# Patient Record
Sex: MALE | Race: BLACK OR AFRICAN AMERICAN | NOT HISPANIC OR LATINO | Employment: OTHER | URBAN - METROPOLITAN AREA
[De-identification: names, ages, dates, MRNs, and addresses within clinical notes are randomized per-mention and may not be internally consistent; named-entity substitution may affect disease eponyms.]

---

## 2021-03-05 ENCOUNTER — IMMUNIZATION (OUTPATIENT)
Dept: INTERNAL MEDICINE | Facility: CLINIC | Age: 71
End: 2021-03-05

## 2021-03-05 DIAGNOSIS — Z23 NEED FOR VACCINATION: Primary | ICD-10-CM

## 2021-03-05 PROCEDURE — 0001A COVID-19, MRNA, LNP-S, PF, 30 MCG/0.3 ML DOSE VACCINE: CPT | Mod: CV19,,, | Performed by: FAMILY MEDICINE

## 2021-03-05 PROCEDURE — 91300 COVID-19, MRNA, LNP-S, PF, 30 MCG/0.3 ML DOSE VACCINE: ICD-10-PCS | Mod: ,,, | Performed by: FAMILY MEDICINE

## 2021-03-05 PROCEDURE — 0001A COVID-19, MRNA, LNP-S, PF, 30 MCG/0.3 ML DOSE VACCINE: ICD-10-PCS | Mod: CV19,,, | Performed by: FAMILY MEDICINE

## 2021-03-05 PROCEDURE — 91300 COVID-19, MRNA, LNP-S, PF, 30 MCG/0.3 ML DOSE VACCINE: CPT | Mod: ,,, | Performed by: FAMILY MEDICINE

## 2021-03-26 ENCOUNTER — IMMUNIZATION (OUTPATIENT)
Dept: INTERNAL MEDICINE | Facility: CLINIC | Age: 71
End: 2021-03-26

## 2021-03-26 DIAGNOSIS — Z23 NEED FOR VACCINATION: Primary | ICD-10-CM

## 2021-03-26 PROCEDURE — 91300 COVID-19, MRNA, LNP-S, PF, 30 MCG/0.3 ML DOSE VACCINE: ICD-10-PCS | Mod: ,,, | Performed by: FAMILY MEDICINE

## 2021-03-26 PROCEDURE — 0002A COVID-19, MRNA, LNP-S, PF, 30 MCG/0.3 ML DOSE VACCINE: CPT | Mod: CV19,,, | Performed by: FAMILY MEDICINE

## 2021-03-26 PROCEDURE — 0002A COVID-19, MRNA, LNP-S, PF, 30 MCG/0.3 ML DOSE VACCINE: ICD-10-PCS | Mod: CV19,,, | Performed by: FAMILY MEDICINE

## 2021-03-26 PROCEDURE — 91300 COVID-19, MRNA, LNP-S, PF, 30 MCG/0.3 ML DOSE VACCINE: CPT | Mod: ,,, | Performed by: FAMILY MEDICINE

## 2021-09-19 ENCOUNTER — IMMUNIZATION (OUTPATIENT)
Dept: PRIMARY CARE CLINIC | Facility: CLINIC | Age: 71
End: 2021-09-19
Payer: OTHER GOVERNMENT

## 2021-09-19 DIAGNOSIS — Z23 NEED FOR VACCINATION: Primary | ICD-10-CM

## 2021-09-19 PROCEDURE — 91300 COVID-19, MRNA, LNP-S, PF, 30 MCG/0.3 ML DOSE VACCINE: CPT | Mod: S$GLB,,, | Performed by: FAMILY MEDICINE

## 2021-09-19 PROCEDURE — 91300 COVID-19, MRNA, LNP-S, PF, 30 MCG/0.3 ML DOSE VACCINE: ICD-10-PCS | Mod: S$GLB,,, | Performed by: FAMILY MEDICINE

## 2021-09-19 PROCEDURE — 0003A COVID-19, MRNA, LNP-S, PF, 30 MCG/0.3 ML DOSE VACCINE: ICD-10-PCS | Mod: S$GLB,,, | Performed by: FAMILY MEDICINE

## 2021-09-19 PROCEDURE — 0003A COVID-19, MRNA, LNP-S, PF, 30 MCG/0.3 ML DOSE VACCINE: CPT | Mod: S$GLB,,, | Performed by: FAMILY MEDICINE

## 2023-05-18 ENCOUNTER — TELEPHONE (OUTPATIENT)
Dept: GASTROENTEROLOGY | Facility: CLINIC | Age: 73
End: 2023-05-18
Payer: OTHER GOVERNMENT

## 2023-05-31 ENCOUNTER — OFFICE VISIT (OUTPATIENT)
Dept: OPHTHALMOLOGY | Facility: CLINIC | Age: 73
End: 2023-05-31
Payer: OTHER GOVERNMENT

## 2023-05-31 DIAGNOSIS — H40.1134 PRIMARY OPEN ANGLE GLAUCOMA (POAG) OF BOTH EYES, INDETERMINATE STAGE: ICD-10-CM

## 2023-05-31 DIAGNOSIS — H25.12 NUCLEAR SCLEROSIS OF LEFT EYE: ICD-10-CM

## 2023-05-31 DIAGNOSIS — H25.11 NUCLEAR SCLEROSIS OF RIGHT EYE: ICD-10-CM

## 2023-05-31 DIAGNOSIS — H34.231 BRAO (BRANCH RETINAL ARTERY OCCLUSION), RIGHT: Primary | ICD-10-CM

## 2023-05-31 PROCEDURE — 99204 PR OFFICE/OUTPT VISIT, NEW, LEVL IV, 45-59 MIN: ICD-10-PCS | Mod: S$PBB,,, | Performed by: STUDENT IN AN ORGANIZED HEALTH CARE EDUCATION/TRAINING PROGRAM

## 2023-05-31 PROCEDURE — 92133 POSTERIOR SEGMENT OCT OPTIC NERVE(OCULAR COHERENCE TOMOGRAPHY) - OU - BOTH EYES: ICD-10-PCS | Mod: 26,S$PBB,, | Performed by: STUDENT IN AN ORGANIZED HEALTH CARE EDUCATION/TRAINING PROGRAM

## 2023-05-31 PROCEDURE — 99999 PR PBB SHADOW E&M-EST. PATIENT-LVL II: ICD-10-PCS | Mod: PBBFAC,,, | Performed by: STUDENT IN AN ORGANIZED HEALTH CARE EDUCATION/TRAINING PROGRAM

## 2023-05-31 PROCEDURE — 99999 PR PBB SHADOW E&M-EST. PATIENT-LVL II: CPT | Mod: PBBFAC,,, | Performed by: STUDENT IN AN ORGANIZED HEALTH CARE EDUCATION/TRAINING PROGRAM

## 2023-05-31 PROCEDURE — 99212 OFFICE O/P EST SF 10 MIN: CPT | Mod: PBBFAC | Performed by: STUDENT IN AN ORGANIZED HEALTH CARE EDUCATION/TRAINING PROGRAM

## 2023-05-31 PROCEDURE — 92133 CPTRZD OPH DX IMG PST SGM ON: CPT | Mod: PBBFAC | Performed by: STUDENT IN AN ORGANIZED HEALTH CARE EDUCATION/TRAINING PROGRAM

## 2023-05-31 PROCEDURE — 99204 OFFICE O/P NEW MOD 45 MIN: CPT | Mod: S$PBB,,, | Performed by: STUDENT IN AN ORGANIZED HEALTH CARE EDUCATION/TRAINING PROGRAM

## 2023-05-31 NOTE — PROGRESS NOTES
HPI     Glaucoma     Additional comments: Patient states va is stable. Patient states he is   using an eye drops but does not recall the name of it. Patient denies any   pain or irritation.          Last edited by Sandi Villegas on 5/31/2023  9:12 AM.            Assessment /Plan     For exam results, see Encounter Report.    BRAO (branch retinal artery occlusion), right- BRAO, sclerotic vessel noted in macular will forward to PCP for thrombotic workup including carotid doppler, echocardiogram, and consideration of CNS imagining   Referring patient to  for retinal eval     Nuclear sclerosis of right eye- - NVS, monitor  Nuclear sclerosis of left eye    Primary open angle glaucoma (POAG) of both eyes, indeterminate stage- IOP Controlled with no evidence of progression. Continue current treatment. Reviewed importance of continued compliance with treatment and follow up.   Continue:  Cosopt BID OU      Return to clinic next aval. For HVF 24-2, IOP CHECK  Return to clinic with  for retinal eval and IVFA in 1-2M

## 2023-06-07 ENCOUNTER — OFFICE VISIT (OUTPATIENT)
Dept: GASTROENTEROLOGY | Facility: CLINIC | Age: 73
End: 2023-06-07
Payer: OTHER GOVERNMENT

## 2023-06-07 VITALS
HEIGHT: 67 IN | DIASTOLIC BLOOD PRESSURE: 84 MMHG | HEART RATE: 69 BPM | BODY MASS INDEX: 28.68 KG/M2 | WEIGHT: 182.75 LBS | SYSTOLIC BLOOD PRESSURE: 156 MMHG

## 2023-06-07 DIAGNOSIS — Z86.010 HISTORY OF COLON POLYPS: Primary | ICD-10-CM

## 2023-06-07 PROCEDURE — 99999 PR PBB SHADOW E&M-EST. PATIENT-LVL V: CPT | Mod: PBBFAC,,, | Performed by: NURSE PRACTITIONER

## 2023-06-07 PROCEDURE — 99999 PR PBB SHADOW E&M-EST. PATIENT-LVL V: ICD-10-PCS | Mod: PBBFAC,,, | Performed by: NURSE PRACTITIONER

## 2023-06-07 PROCEDURE — 99499 NO LOS: ICD-10-PCS | Mod: S$PBB,,, | Performed by: NURSE PRACTITIONER

## 2023-06-07 PROCEDURE — 99215 OFFICE O/P EST HI 40 MIN: CPT | Mod: PBBFAC | Performed by: NURSE PRACTITIONER

## 2023-06-07 PROCEDURE — 99499 UNLISTED E&M SERVICE: CPT | Mod: S$PBB,,, | Performed by: NURSE PRACTITIONER

## 2023-06-07 RX ORDER — TRIAMCINOLONE ACETONIDE 1 MG/G
CREAM TOPICAL
COMMUNITY
Start: 2023-02-15

## 2023-06-07 RX ORDER — DICLOFENAC SODIUM 10 MG/G
GEL TOPICAL
COMMUNITY
Start: 2022-11-25

## 2023-06-07 RX ORDER — AMLODIPINE BESYLATE 10 MG/1
10 TABLET ORAL
COMMUNITY
Start: 2023-03-15

## 2023-06-07 RX ORDER — PRAMOXINE HYDROCHLORIDE 10 MG/ML
LOTION TOPICAL
COMMUNITY
Start: 2023-02-15

## 2023-06-07 RX ORDER — LOSARTAN POTASSIUM 100 MG/1
50 TABLET ORAL
COMMUNITY
Start: 2023-03-15

## 2023-06-07 RX ORDER — POLYETHYLENE GLYCOL 3350, SODIUM SULFATE ANHYDROUS, SODIUM BICARBONATE, SODIUM CHLORIDE, POTASSIUM CHLORIDE 236; 22.74; 6.74; 5.86; 2.97 G/4L; G/4L; G/4L; G/4L; G/4L
4 POWDER, FOR SOLUTION ORAL ONCE
Qty: 4000 ML | Refills: 0 | Status: SHIPPED | OUTPATIENT
Start: 2023-06-07 | End: 2023-06-07

## 2023-06-07 RX ORDER — MELOXICAM 7.5 MG/1
7.5 TABLET ORAL
COMMUNITY
Start: 2023-01-03

## 2023-06-07 RX ORDER — CHOLECALCIFEROL (VITAMIN D3) 50 MCG
50 TABLET ORAL
COMMUNITY
Start: 2023-03-14

## 2023-06-07 RX ORDER — MINERAL OIL/UREA/PEG/WATER
LOTION (ML) TOPICAL
COMMUNITY
Start: 2023-02-13

## 2023-06-07 RX ORDER — DORZOLAMIDE HYDROCHLORIDE AND TIMOLOL MALEATE 20; 5 MG/ML; MG/ML
SOLUTION/ DROPS OPHTHALMIC
COMMUNITY
Start: 2023-03-02

## 2023-06-07 RX ORDER — METFORMIN HYDROCHLORIDE 500 MG/1
1 TABLET, EXTENDED RELEASE ORAL DAILY
COMMUNITY
Start: 2022-11-25 | End: 2023-08-15

## 2023-06-07 RX ORDER — SOD SULF/POT CHLORIDE/MAG SULF 1.479 G
12 TABLET ORAL DAILY
Qty: 24 TABLET | Refills: 0 | Status: SHIPPED | OUTPATIENT
Start: 2023-06-07 | End: 2023-08-15

## 2023-06-07 RX ORDER — AMMONIUM LACTATE 12 G/100G
LOTION TOPICAL
COMMUNITY
Start: 2023-02-15

## 2023-06-07 RX ORDER — LIDOCAINE 50 MG/G
PATCH TOPICAL
COMMUNITY
Start: 2023-02-13

## 2023-06-07 NOTE — PROGRESS NOTES
Clinic Consult:  [facility] Consultation Note    Reason for Consult:  The encounter diagnosis was History of colon polyps.    PCP: Marco Schwartz   [address] [facility] [address]    HPI:  This is a 72 y.o. male here for evaluation of colon cancer screening.   Prior colonoscopy?: yes  Date of last colonoscopy: over 5 years   History of colon polyps: yes  Recall: ?  Family history of colon cancer: no  Abdominal pain, hematochezia, melena, nausea, vomiting, or weight loss: no    Review of Systems   Constitutional:  Negative for fever and weight loss.   HENT:  Negative for sore throat.    Respiratory:  Negative for cough, shortness of breath and wheezing.    Cardiovascular:  Negative for chest pain and palpitations.   Gastrointestinal:  Negative for abdominal pain, blood in stool, constipation, diarrhea, heartburn, melena, nausea and vomiting.   Genitourinary:  Negative for dysuria and frequency.   Skin:  Negative for itching and rash.   Neurological:  Negative for dizziness, speech change, seizures, loss of consciousness and headaches.     Medical History:  has a past medical history of HTN (hypertension) and Personal history of colonic polyps.    Surgical History:  has a past surgical history that includes Appendectomy and Knee surgery (Bilateral).    Family History: family history includes Diabetes in his mother..     Social History:  reports that he has quit smoking. He has never used smokeless tobacco.    Allergies: Reviewed    Home Medications:   Current Outpatient Medications on File Prior to Visit   Medication Sig Dispense Refill    amLODIPine (NORVASC) 10 MG tablet 10 mg.      ammonium lactate (LAC-HYDRIN) 12 % lotion APPLY LIBERAL AMOUNT TOPICALLY TWICE A DAY FOR DRY SKIN      cholecalciferol, vitamin D3, (VITAMIN D3) 50 mcg (2,000 unit) Tab 50 mcg.      diclofenac sodium (VOLTAREN) 1 % Gel APPLY 4 GRAMS TOPICALLY FOUR TIMES A DAY AS NEEDED FOR PAIN AND INFLAMMATION. USE ENCLOSED DOSING  "CARD.      dorzolamide-timolol 2-0.5% (COSOPT) 22.3-6.8 mg/mL ophthalmic solution INSTILL 1 DROP IN EACH EYE TWICE A DAY FOR GLAUCOMA      emollient combination no.117 (EUCERIN ADVANCED REPAIR HAND) Crea APPLY TO LEGS A SMALL AMOUNT TOPICALLY ONCE DAILY AS NEEDED FOR DRY SKIN **APPLY TO DAMP SKIN AFTER BATH/SHOWER ROUTINELY**      LIDOcaine (LIDODERM) 5 % APPLY 1 PATCH TOPICALLY EVERY DAY AS NEEDED FOR PAIN WEAR FOR 12 HOURS, THEN REMOVE. DO NOT APPLY NEW PATCH FOR AT LEAST 12 HOURS      losartan (COZAAR) 100 MG tablet 50 mg.      meloxicam (MOBIC) 7.5 MG tablet 7.5 mg.      metFORMIN (GLUCOPHAGE-XR) 500 MG ER 24hr tablet Take 1 tablet by mouth once daily.      pramoxine 1 % Lotn APPLY LIBERAL AMOUNT TOPICALLY  AS NEEDED KEEP IN FRIDGE, USE AS NEEDED FOR ITCHING      triamcinolone acetonide 0.1% (KENALOG) 0.1 % cream APPLY SMALL AMOUNT TOPICALLY TWICE A DAY FOR ITCHING APPLY CREAM TO RED ITCHY RASH ON LEG FOR 2-4 WEEKS, STOP WHEN CLEAR.       No current facility-administered medications on file prior to visit.       Physical Exam:  BP (!) 156/84 (BP Location: Left arm, Patient Position: Sitting, BP Method: Medium (Manual))   Pulse 69   Ht 5' 7" (1.702 m)   Wt 82.9 kg (182 lb 12.2 oz)   BMI 28.62 kg/m²   Body mass index is 28.62 kg/m².  Physical Exam  Constitutional:       General: He is not in acute distress.  HENT:      Head: Normocephalic and atraumatic.   Eyes:      General: No scleral icterus.     Conjunctiva/sclera: Conjunctivae normal.   Cardiovascular:      Rate and Rhythm: Normal rate and regular rhythm.      Heart sounds: Normal heart sounds.   Pulmonary:      Effort: Pulmonary effort is normal. No respiratory distress.      Breath sounds: Normal breath sounds.   Skin:     General: Skin is warm and dry.      Findings: No rash.   Neurological:      General: No focal deficit present.      Mental Status: He is alert and oriented to person, place, and time.   Psychiatric:         Mood and Affect: Mood " normal.         Behavior: Behavior normal.       Labs: Pertinent labs reviewed.  CRC Screening: due     Assessment:  1. History of colon polyps      Recommendations:   - colonoscopy  - A referral has been placed for endoscopy procedure scheduling and pre-admission testing (PAT) appointment has been scheduled.      History of colon polyps  -     Case Request Endoscopy: COLONOSCOPY  -     Ambulatory referral/consult to Endo Procedure ; Future; Expected date: 06/08/2023  -     polyethylene glycol (GOLYTELY) 236-22.74-6.74 -5.86 gram suspension; Take 4,000 mLs (4 L total) by mouth once. for 1 dose  Dispense: 4000 mL; Refill: 0  -     sod sulf-pot chloride-mag sulf (SUTAB) 1.479-0.188- 0.225 gram tablet; Take 12 tablets by mouth once daily. Take according to package instructions with indicated amount of water.  Dispense: 24 tablet; Refill: 0      Follow up if symptoms worsen or fail to improve.    Thank you so much for allowing me to participate in the care of SOY Magana

## 2023-06-08 ENCOUNTER — HOSPITAL ENCOUNTER (OUTPATIENT)
Dept: PREADMISSION TESTING | Facility: HOSPITAL | Age: 73
Discharge: HOME | End: 2023-06-08
Attending: INTERNAL MEDICINE
Payer: OTHER GOVERNMENT

## 2023-06-08 DIAGNOSIS — Z86.010 HISTORY OF COLON POLYPS: ICD-10-CM

## 2023-06-26 ENCOUNTER — HOSPITAL ENCOUNTER (OUTPATIENT)
Dept: PREADMISSION TESTING | Facility: HOSPITAL | Age: 73
Discharge: HOME | End: 2023-06-26
Attending: INTERNAL MEDICINE
Payer: OTHER GOVERNMENT

## 2023-07-03 ENCOUNTER — ANESTHESIA EVENT (OUTPATIENT)
Dept: ENDOSCOPY | Facility: HOSPITAL | Age: 73
End: 2023-07-03
Payer: OTHER GOVERNMENT

## 2023-07-03 NOTE — ANESTHESIA PREPROCEDURE EVALUATION
07/03/2023  Cb Hooper is a 72 y.o., male.  Past Medical History:   Diagnosis Date    HTN (hypertension)     Personal history of colonic polyps      Past Surgical History:   Procedure Laterality Date    APPENDECTOMY      KNEE SURGERY Bilateral          Pre-op Assessment    I have reviewed the Patient Summary Reports.     I have reviewed the Nursing Notes. I have reviewed the NPO Status.   I have reviewed the Medications.     Review of Systems  Anesthesia Hx:  No problems with previous Anesthesia    Social:  Non-Smoker, Social Alcohol Use    Hematology/Oncology:  Hematology Normal   Oncology Normal     EENT/Dental:EENT/Dental Normal   Cardiovascular:   Hypertension    Pulmonary:  Pulmonary Normal    Renal/:  Renal/ Normal     Hepatic/GI:  Hepatic/GI Normal    Musculoskeletal:  Musculoskeletal Normal    Neurological:  Neurology Normal    Endocrine:  Endocrine Normal    Dermatological:  Skin Normal    Psych:  Psychiatric Normal           Physical Exam  General: Well nourished, Cooperative, Alert and Oriented    Airway:  Mallampati: II   Mouth Opening: Normal  TM Distance: Normal  Tongue: Normal  Neck ROM: Normal ROM    Dental:  Partial Dentures, Intact  Partials out      Anesthesia Plan  Type of Anesthesia, risks & benefits discussed:    Anesthesia Type: Gen Natural Airway  Intra-op Monitoring Plan: Standard ASA Monitors  Induction:  IV  Informed Consent: Informed consent signed with the Patient and all parties understand the risks and agree with anesthesia plan.  All questions answered. Patient consented to blood products? No  ASA Score: 2  Day of Surgery Review of History & Physical: H&P Update referred to the surgeon/provider.    Ready For Surgery From Anesthesia Perspective.     .

## 2023-07-05 RX ORDER — METFORMIN HYDROCHLORIDE 500 MG/1
500 TABLET, EXTENDED RELEASE ORAL
COMMUNITY
Start: 2023-06-12

## 2023-07-07 ENCOUNTER — ANESTHESIA (OUTPATIENT)
Dept: ENDOSCOPY | Facility: HOSPITAL | Age: 73
End: 2023-07-07
Payer: OTHER GOVERNMENT

## 2023-07-07 ENCOUNTER — HOSPITAL ENCOUNTER (OUTPATIENT)
Facility: HOSPITAL | Age: 73
Discharge: HOME | End: 2023-07-07
Attending: INTERNAL MEDICINE | Admitting: INTERNAL MEDICINE
Payer: OTHER GOVERNMENT

## 2023-07-07 VITALS
SYSTOLIC BLOOD PRESSURE: 172 MMHG | HEIGHT: 66 IN | RESPIRATION RATE: 20 BRPM | WEIGHT: 181.56 LBS | TEMPERATURE: 98 F | OXYGEN SATURATION: 99 % | BODY MASS INDEX: 29.18 KG/M2 | DIASTOLIC BLOOD PRESSURE: 84 MMHG | HEART RATE: 51 BPM

## 2023-07-07 DIAGNOSIS — Z12.11 COLON CANCER SCREENING: Primary | ICD-10-CM

## 2023-07-07 LAB — POCT GLUCOSE: 107 MG/DL (ref 70–110)

## 2023-07-07 PROCEDURE — 63600175 PHARM REV CODE 636 W HCPCS: Performed by: INTERNAL MEDICINE

## 2023-07-07 PROCEDURE — D9220A PRA ANESTHESIA: ICD-10-PCS | Mod: 33,CRNA,, | Performed by: NURSE ANESTHETIST, CERTIFIED REGISTERED

## 2023-07-07 PROCEDURE — D9220A PRA ANESTHESIA: Mod: 33,CRNA,, | Performed by: NURSE ANESTHETIST, CERTIFIED REGISTERED

## 2023-07-07 PROCEDURE — 37000009 HC ANESTHESIA EA ADD 15 MINS: Performed by: INTERNAL MEDICINE

## 2023-07-07 PROCEDURE — 45385 COLONOSCOPY W/LESION REMOVAL: CPT | Mod: 33,,, | Performed by: INTERNAL MEDICINE

## 2023-07-07 PROCEDURE — 45385 PR COLONOSCOPY,REMV LESN,SNARE: ICD-10-PCS | Mod: 33,,, | Performed by: INTERNAL MEDICINE

## 2023-07-07 PROCEDURE — 88305 TISSUE EXAM BY PATHOLOGIST: ICD-10-PCS | Mod: 26,,, | Performed by: STUDENT IN AN ORGANIZED HEALTH CARE EDUCATION/TRAINING PROGRAM

## 2023-07-07 PROCEDURE — 88305 TISSUE EXAM BY PATHOLOGIST: CPT | Mod: 59 | Performed by: STUDENT IN AN ORGANIZED HEALTH CARE EDUCATION/TRAINING PROGRAM

## 2023-07-07 PROCEDURE — 00811 ANES LWR INTST NDSC NOS: CPT | Performed by: INTERNAL MEDICINE

## 2023-07-07 PROCEDURE — D9220A PRA ANESTHESIA: ICD-10-PCS | Mod: 33,ANES,, | Performed by: ANESTHESIOLOGY

## 2023-07-07 PROCEDURE — D9220A PRA ANESTHESIA: Mod: 33,ANES,, | Performed by: ANESTHESIOLOGY

## 2023-07-07 PROCEDURE — 88305 TISSUE EXAM BY PATHOLOGIST: CPT | Mod: 26,,, | Performed by: STUDENT IN AN ORGANIZED HEALTH CARE EDUCATION/TRAINING PROGRAM

## 2023-07-07 PROCEDURE — 37000008 HC ANESTHESIA 1ST 15 MINUTES: Performed by: INTERNAL MEDICINE

## 2023-07-07 PROCEDURE — 45385 COLONOSCOPY W/LESION REMOVAL: CPT | Mod: PT | Performed by: INTERNAL MEDICINE

## 2023-07-07 PROCEDURE — 63600175 PHARM REV CODE 636 W HCPCS: Performed by: NURSE ANESTHETIST, CERTIFIED REGISTERED

## 2023-07-07 PROCEDURE — 27201089 HC SNARE, DISP (ANY): Performed by: INTERNAL MEDICINE

## 2023-07-07 PROCEDURE — 25000003 PHARM REV CODE 250: Performed by: NURSE ANESTHETIST, CERTIFIED REGISTERED

## 2023-07-07 RX ORDER — SODIUM CHLORIDE, SODIUM LACTATE, POTASSIUM CHLORIDE, CALCIUM CHLORIDE 600; 310; 30; 20 MG/100ML; MG/100ML; MG/100ML; MG/100ML
INJECTION, SOLUTION INTRAVENOUS CONTINUOUS
Status: DISCONTINUED | OUTPATIENT
Start: 2023-07-07 | End: 2023-07-07 | Stop reason: HOSPADM

## 2023-07-07 RX ORDER — PROPOFOL 10 MG/ML
VIAL (ML) INTRAVENOUS
Status: DISCONTINUED | OUTPATIENT
Start: 2023-07-07 | End: 2023-07-07

## 2023-07-07 RX ORDER — LIDOCAINE HYDROCHLORIDE 20 MG/ML
INJECTION INTRAVENOUS
Status: DISCONTINUED | OUTPATIENT
Start: 2023-07-07 | End: 2023-07-07

## 2023-07-07 RX ADMIN — LIDOCAINE HYDROCHLORIDE 20 MG: 20 INJECTION INTRAVENOUS at 01:07

## 2023-07-07 RX ADMIN — PROPOFOL 50 MG: 10 INJECTION, EMULSION INTRAVENOUS at 01:07

## 2023-07-07 RX ADMIN — SODIUM CHLORIDE, POTASSIUM CHLORIDE, SODIUM LACTATE AND CALCIUM CHLORIDE: 600; 310; 30; 20 INJECTION, SOLUTION INTRAVENOUS at 12:07

## 2023-07-07 RX ADMIN — GLYCOPYRROLATE 0.2 MG: 0.2 INJECTION, SOLUTION INTRAMUSCULAR; INTRAVITREAL at 01:07

## 2023-07-07 NOTE — PR[employer]ATION PATIENT INSTRUCTIONS
Discharge Summary/Instructions after an Endoscopic Procedure  Patient Name: Cb Hooper  Patient MRN: [id number]  Patient YOB: 1950 Friday, July 7, 2023  Kyle Henry MD  Dear patient,  As a result of recent federal legislation (The Federal Cures Act), you may   receive lab or pathology results from your procedure in your [facility]   account before your physician is able to contact you. Your physician or   their representative will relay the results to you with their   recommendations at their soonest availability.  Thank you,  RESTRICTIONS:  During your procedure today, you received medications for sedation.  These   medications may affect your judgment, balance and coordination.  Therefore,   for 24 hours, you have the following restrictions:   - DO NOT drive a car, operate machinery, make legal/financial decisions,   sign important papers or drink alcohol.    ACTIVITY:  Today: no heavy lifting, straining or running due to procedural   sedation/anesthesia.  The following day: return to full activity including work.  DIET:  Eat and drink normally unless instructed otherwise.     TREATMENT FOR COMMON SIDE EFFECTS:  - Mild abdominal pain, nausea, belching, bloating or excessive gas:  rest,   eat lightly and use a heating pad.  - Sore Throat: treat with throat lozenges and/or gargle with warm salt   water.  - Because air was used during the procedure, expelling large amounts of air   from your rectum or belching is normal.  - If a bowel prep was taken, you may not have a bowel movement for 1-3 days.    This is normal.  SYMPTOMS TO WATCH FOR AND REPORT TO YOUR PHYSICIAN:  1. Abdominal pain or bloating, other than gas cramps.  2. Chest pain.  3. Back pain.  4. Signs of infection such as: chills or fever occurring within 24 hours   after the procedure.  5. Rectal bleeding, which would show as bright red, maroon, or black stools.   (A tablespoon of blood from the rectum is not serious, especially if    hemorrhoids are present.)  6. Vomiting.  7. Weakness or dizziness.  GO DIRECTLY TO THE NEAREST EMERGENCY ROOM IF YOU HAVE ANY OF THE FOLLOWING:      Difficulty breathing              Chills and/or fever over 101 F   Persistent vomiting and/or vomiting blood   Severe abdominal pain   Severe chest pain   Black, tarry stools   Bleeding- more than one tablespoon   Any other symptom or condition that you feel may need urgent attention  Your doctor recommends these additional instructions:  If any biopsies were taken, your doctors clinic will contact you in 1 to 2   weeks with any results.  - Discharge patient to home (via wheelchair).   - Resume previous diet.   - Continue present medications.   - Await pathology results.   - Repeat colonoscopy in 3 years for surveillance based on clinical status at   that time.   - Return to referring physician as previously scheduled.  For questions, problems or results please call your physician Kyle Henry MD at Work:  [phone]  If you have any questions about the above instructions, call the GI   department at [phone] or call the endoscopy unit at [phone]   from 7am until 3 pm.  [facility] [place], EMERGENCY ROOM PHONE NUMBER:   [phone]  IF A COMPLICATION OR EMERGENCY SITUATION ARISES AND YOU ARE UNABLE TO REACH   YOUR PHYSICIAN - GO DIRECTLY TO THE EMERGENCY ROOM.  I have read or have had read to me these discharge instructions for my   procedure and have received a written copy.  I understand these   instructions and will follow-up with my physician if I have any questions.     __________________________________       _____________________________________  Nurse Signature                                          Patient/Designated   Responsible Party Signature  MD Kyle Toscano MD  7/7/2023 1:36:33 PM  This report has been verified and signed electronically.  Dear patient,  As a result of recent  federal legislation (The Federal Cures Act), you may   receive lab or pathology results from your procedure in your [facility]   account before your physician is able to contact you. Your physician or   their representative will relay the results to you with their   recommendations at their soonest availability.  Thank you,  PROVATION

## 2023-07-07 NOTE — ANESTHESIA POSTPROCEDURE EVALUATION
Anesthesia Post Evaluation    Patient: Cb Hooper    Procedure(s) Performed: Procedure(s) (LRB):  COLONOSCOPY (N/A)    Final Anesthesia Type: general      Patient location during evaluation: PACU  Patient participation: Yes- Able to Participate  Level of consciousness: awake and alert and oriented  Post-procedure vital signs: reviewed and stable  Pain management: adequate  Airway patency: patent    PONV status at discharge: No PONV  Anesthetic complications: no      Cardiovascular status: blood pressure returned to baseline, stable and hemodynamically stable  Respiratory status: unassisted  Hydration status: euvolemic  Follow-up not needed.          Vitals Value Taken Time   /84 07/07/23 1413   Temp 36.6 °C (97.8 °F) 07/07/23 1413   Pulse 51 07/07/23 1413   Resp 20 07/07/23 1413   SpO2 99 % 07/07/23 1413         No case tracking events are documented in the log.      Pain/Andre Score: Andre Score: 10 (7/7/2023  2:00 PM)

## 2023-07-07 NOTE — PROGRESS NOTES
Pt HR 50-58, sinus bradycardia, awake /alert /oriented x 4. Consulted with Dr. Pierce pt ok to be discharge with lower heart rate to take PO valsartan once home and resume Amlodipine tomorrow per anesthesia orders. Pt and wife verbalized understanding with plan of care, no questions.

## 2023-07-07 NOTE — H&P
Endoscopy History and Physical    PCP - Marco Schwartz MD  Referring Physician - Maci Clement, [place]  [address] [place] [address]      ASA - per anesthesia  Mallampati - per anesthesia  History of Anesthesia problems - no  Family history Anesthesia problems -  no   Plan of anesthesia - General    HPI  72 y.o. male    Planned Procedure: Colonoscopy  Diagnosis: previous adenomatous polyp  Chief Complaint: Same as above    Personnel H/o colon polyps:yes  FH of colon cancer:no  Anticoagulation:no      ROS:  Constitutional: No fevers, chills, No weight loss  CV: No chest pain  Pulm: No cough, No shortness of breath  GI: see HPI    Medical History:  has a past medical history of HTN (hypertension) and Personal history of colonic polyps.    Surgical History:  has a past surgical history that includes Appendectomy and Knee surgery (Bilateral).    Family History: family history includes Diabetes in his mother..    Social History:  reports that he has quit smoking. He has never used smokeless tobacco.    Review of patient's allergies indicates:  No Known Allergies    Medications:   Medications Prior to Admission   Medication Sig Dispense Refill Last Dose    amLODIPine (NORVASC) 10 MG tablet 10 mg.   7/6/2023    ammonium lactate (LAC-HYDRIN) 12 % lotion APPLY LIBERAL AMOUNT TOPICALLY TWICE A DAY FOR DRY SKIN   Past Week    cholecalciferol, vitamin D3, (VITAMIN D3) 50 mcg (2,000 unit) Tab 50 mcg.   7/6/2023    diclofenac sodium (VOLTAREN) 1 % Gel APPLY 4 GRAMS TOPICALLY FOUR TIMES A DAY AS NEEDED FOR PAIN AND INFLAMMATION. USE ENCLOSED DOSING CARD.   Past Week    dorzolamide-timolol 2-0.5% (COSOPT) 22.3-6.8 mg/mL ophthalmic solution INSTILL 1 DROP IN EACH EYE TWICE A DAY FOR GLAUCOMA   Past Week    emollient combination no.117 (EUCERIN ADVANCED REPAIR HAND) Crea APPLY TO LEGS A SMALL AMOUNT TOPICALLY ONCE DAILY AS NEEDED FOR DRY SKIN **APPLY TO DAMP SKIN AFTER BATH/SHOWER ROUTINELY**   Past Week     LIDOcaine (LIDODERM) 5 % APPLY 1 PATCH TOPICALLY EVERY DAY AS NEEDED FOR PAIN WEAR FOR 12 HOURS, THEN REMOVE. DO NOT APPLY NEW PATCH FOR AT LEAST 12 HOURS   Past Week    losartan (COZAAR) 100 MG tablet 50 mg.   7/6/2023    meloxicam (MOBIC) 7.5 MG tablet 7.5 mg.   Past Week    metFORMIN (GLUCOPHAGE-XR) 500 MG ER 24hr tablet Take 1 tablet by mouth once daily.   7/6/2023    metFORMIN (GLUCOPHAGE-XR) 500 MG ER 24hr tablet 500 mg.   7/6/2023    pramoxine 1 % Lotn APPLY LIBERAL AMOUNT TOPICALLY  AS NEEDED KEEP IN FRIDGE, USE AS NEEDED FOR ITCHING   Past Week    sod sulf-pot chloride-mag sulf (SUTAB) 1.479-0.188- 0.225 gram tablet Take 12 tablets by mouth once daily. Take according to package instructions with indicated amount of water. 24 tablet 0 7/7/2023    triamcinolone acetonide 0.1% (KENALOG) 0.1 % cream APPLY SMALL AMOUNT TOPICALLY TWICE A DAY FOR ITCHING APPLY CREAM TO RED ITCHY RASH ON LEG FOR 2-4 WEEKS, STOP WHEN CLEAR.   Past Week       Physical Exam:    Vital Signs:   Vitals:    07/07/23 1251   BP: (!) 183/84   Pulse: (!) 58   Resp: 18   Temp: 97.9 °F (36.6 °C)       General Appearance: Well appearing in no acute distress  Abdomen: Soft, non tender, non distended with normal bowel sounds, no masses    Labs:  Lab Results   Component Value Date    WBC 5.88 06/24/2011    HGB 11.7 (L) 06/24/2011    HCT 36.3 (L) 06/24/2011     06/24/2011    CHOL 204 (H) 10/08/2014    TRIG 69 10/08/2014    HDL 75 10/08/2014    ALT 37 10/08/2014    AST 37 10/08/2014     10/08/2014    K 5.0 10/08/2014     10/08/2014    CREATININE 1.3 04/21/2015    BUN 16 10/08/2014    CO2 23 10/08/2014    TSH 1.330 10/08/2014    PSA 0.42 10/08/2014       I have explained the risks and benefits of this endoscopic procedure to the patient including but not limited to bleeding, inflammation, infection, perforation, and death.    SEDATION PLAN: per anesthesia       History reviewed, vital signs satisfactory, cardiopulmonary status  satisfactory, sedation options, risks and plans have been discussed with the patient  All their questions were answered and the patient agrees to the sedation procedures as planned and the patient is deemed an appropriate candidate for the sedation as planned.     The risks, benefits and alternatives of the procedure were discussed with the patient in detail. This discussion was had in the presence of endoscopy staff. The risks include, risks of adverse reaction to sedation requiring the use of reversal agents, bleeding requiring blood transfusion, perforation requiring surgical intervention and technical failure. Other risks include aspiration leading to respiratory distress and respiratory failure resulting in endotracheal intubation and mechanical ventilation including death. If anesthesia is being utilized for this procedure, it is up to the anesthesiologist to determine airway safety including elective endotracheal intubation. Questions were answered, they agree to proceed. There was no language barriers.       Procedure explained to patient, informed consent obtained and placed in chart.       Kyle Henry MD

## 2023-07-07 NOTE — PLAN OF CARE
Discharge instructions reviewed with pt and spouse, handouts given, verbalized understanding with no further questions at this time. Dr. Solano spoke to pt at bedside, reviewed procedure and answered questions aware they are awaiting biopsy results with MD telephone number provided per AVS sheet. VSS on RA, no pain or nausea noted, tolerating po fluids without difficulty, no other complaints noted. Fall precautions reviewed, consents in chart, PIV removed.

## 2023-07-07 NOTE — LETTER
Cb RANGEL Hooper  [address] [facility]  [place] [address]      [facility] ENDOSCOPY PATIENT INSTRUCTIONS  You are scheduled for a/an Colonoscopy (Procedure), on Friday (Day), 7/7/23 (Date).       You will be contacted prior to the day of the procedure with your arrival time.     Your procedure will be performed at [facility]. [facility] [address], [place] [address]. Many GPS systems are NOT providing accurate instructions, take [address], from either direction, to [address] and proceed to the eastbound service road, turning between the Mall and [facility]. Once at the [facility], look for signs directing you to Hospital/Surgery. Check in for your procedure at  for Hospital/Surgery. ([phone])    ALL PATIENTS:   ? Please plan to be at the hospital for 3 - 4 hours.   ? Use of Anesthesia requires you to have a responsible person to drive you to the hospital, stay while the procedure is being performed, assume responsibility for your care at discharge, and drive you home. You should not operate a vehicle, machinery or sign any legal documents until the next day. YOU MUST HAVE A RESPONSIBLE PERSON TO DRIVE YOU HOME.  ? Leave all valuables at home, including jewelry. (Piercings must be removed) You will need to bring your 's license, medical insurance card, and a method of payment. You will be responsible for any co-payment at time of registration - Please reach out to Financial Services if you have any questions or concerns.   ? If biopsies need to be performed or a polyp needs to be removed, this may result in a change in the billing of your procedure and could impact your payment responsibility depending on your insurance provider.   ? Wear clothing appropriate for easy re-dressing after sedation.   ? Please bring a complete list of all medications you are taking.     MEDICATIONS:  ? BLOOD THINNING MEDICATION (Coumadin, Plavix, Eliquis, etc.):  o If  you are on a blood thinning medication, our scheduling team will obtain clearance to hold from your prescribing physician. Please do not stop these medications until it is approved by your provider. Our scheduling nurse will notify you of an appropriate date and time to hold prior to your procedure.  o Coumadin (WARFARIN), Plavix (CLOPIDOGREL), and Effient (PRASUGREL) MUST be stopped 5 days prior to exam unless discussed with the doctor performing the test.   o Eliquis (APIXABAN), Savaysa (EDOXABAN), Arixtra (FONDAPARINUX), and Xarelto (RIVAROXABAN) MUST be stopped 2 days prior to exam unless discussed with the doctor performing the test.  ? WEIGHT LOSS MEDICATIONS - MUST be stopped 1week prior to your appointment  ? BLOOD PRESSURE, HEART, SEIZURE, LUNG, or PSYCHIATRIC MEDICATIONS you normally take in the morning, please take them the morning of your procedure. This includes Inhalers.   o Please take these medications one hour prior to your arrival time with a small sip of water    DIABETIC PATIENTS:   ? Do not take any diabetic medications (including insulin) the morning of the exam.   ? If your blood sugar goes below 70, you may drink 2 ounces of clear juice, soda. Wait 15 minutes, then recheck your blood sugar. If it isn't going up, you may drink another 2 ounces of clear juice and contact the On-Call Nurse line at [phone].       OMCBR GOLYTELY, COLYTE, NULYTELY, GENERIC PEG 3350 W/ELECTROLYTES INSTRUCTIONS  Please use this page as a checklist for your preparation.      [] ITEMS TO PURCHASE BEFORE YOUR PROCEDURE:  Please purchase the following items from your local pharmacy prior to your appointment.    4 Dulcolax (bisacodyl) 5 mg laxative tablets- no prescription needed, over the counter.   Gas X (simethicone) 125 mg capsules - no prescription needed, over the counter.    Dramamine (Meclizine) 25 mg tablet (for nausea)- no prescription needed, over the counter.   Prep- A prescription is required and  "has been sent to your pharmacy    [] FIVE DAYS BEFORE YOUR PROCEDURE: Begin low fiber diet- see attached instructions.    []THE DAY BEFORE YOUR PROCEDURE :(WHEN YOU WAKE)  Begin clear liquids only - no solid foods may be eaten until after your procedure has been performed/completed.  Please drink 1 - 2 gallons of clear liquids throughout the day.   You may consume the following items:    Coffee, water, or tea. (We agree it's odd, but coffee and tea without milk or creamer is considered a clear liquid)    Clear carbonated beverages (soft drinks), ginger ale, sprite, 7up, sparkling water, etc. No "Energy" beverages.    Gelatin dessert, (JELLO) plain or fruit flavored. No red or purple coloring/No solid pieces of fruit.   Apple juice, white grape juice, or white cranberry juice. No pulp, no orange juice.    Gatorade, Powerade, lemonade, or limeade. No red or purple.    Clear, fat-free, beef or chicken broths, or bouillon.    Snowballs, popsicles, slushes. No red or purple coloring, no pulp.    Avoid any liquids not listed above.     [] 12:00 PM (the day before procedure):   Take 4 Dulcolax (bisacodyl) tablets with a glass of clear liquid   Take 1 Gas X (simethicone) 125 mg capsule with a glass of clear liquid    [] 5:00 PM (the day before procedure), for prevention of nausea and vomiting:   Take ½ of a tablet (12.5 mg) of Meclizine every 6 hours as needed for nausea and/or vomiting. DO NOT TAKE MORE THAN 50 MG IN A 24 HOUR PERIOD.    [] 6:00 PM (the day before procedure) Begin the first portion of the Golytely or Nulytely prep. (Best if refrigerated)  1. Add cool clear liquid to mixing container up to the fill line and mix.  2. Drink HALF of the mixture in the container.  3. You may refrigerate remaining half of the prep until ready to use the next morning.  Please have this consumed within 1 hour and 30 minutes. ---VERY IMPORTANT---  This entire process is required for the success of the " examination.    Clear liquids may be continued until you finish the second portion of the prep. This will help you remain hydrated.    [] ____ AM (THE MORNING OF YOUR PROCEDURE): Complete the second portion of your prep. You will be notified the day before of this time.  1. Drink the second HALF of the mixture that was prepared the previous evening.  Please have this consumed within 1 hour and 30 minutes. ---VERY IMPORTANT---  This entire process is required for the success of the examination.      After you complete the bowel prep and the required water/clear liquid, you may not have anything else by mouth except for your medications, with a small sip of water. This includes no gum, mints, tobacco products.    Please follow these instructions to ensure you have a very good prep.  The goal is for stool to be CLEAR OR YELLOW liquid - NOT BROWN.  Avoid having to repeat the procedure due to a poor prep!    Please call [phone] or [phone] if you continue to have brown stool or have any questions about your prep instructions. Remember, due to Anesthesia, you MUST have a responsible person to drive you home!        LOW FIBER DIET  FIVE (5) DAYS BEFORE YOUR PROCEDURE- Please start a Low Fiber Diet as below:  A good preparation (clean out) of the colon is necessary prior to having a colonoscopy in order to ensure that all areas of the colon can be seen without difficulty. It is helpful to avoid high fiber foods prior to having a colonoscopy as high fiber foods (especially seeds and nuts) are more difficult to completely clear out of the colon. We recommend avoiding high fiber foods for at least 4 days prior to your colonoscopy. If you have issues with constipation you may want to start avoiding high fiber foods 7 days before the procedure.    Foods to Include In Your Diet:       Grain Products:    Enriched refined white bread, buns, bagels, English muffins   Plain cereals e.g., Cheerios, Cornflakes, Cream of  Wheat, Rice Krispies, Special K   Tea biscuits, arrowroot cookies, soda crackers, diana crackers, plain [place] toast and flour tortillas  White rice, refined pasta and noodles   Fruits:   peel fruits when possible  Fruit juices except prune juice   Soft fruits: apricots, banana (1/2), cantaloupe, canned fruit cocktail, grapes,   honeydew melon, peaches, watermelon, citrus fruits, plums, pineapple   Sauces: Apple or apricot        Vegetables:    Vegetable juices    Tomato sauces    Potatoes (no skin)    Well-cooked and tender vegetables including alfalfa sprouts, spinach, beets, carrots, celery, cucumber, eggplant, lettuce, mushrooms, green/red peppers, squash, zucchini, onions, brussel sprouts, asparagus     Meat and Protein Choice:  Well-cooked, tender meat, fish and eggs            Foods to avoid:   ? Whole grain breads and pastas, corn bread or muffins, products made with whole grain products, bran, seeds, or nuts   ? Strong cheeses, yogurt containing fruit skins or seeds   ? Raw vegetables and pickles  ? All types of whole kernel corn   ? All beans, peas, and legumes   ? Fruit peels and hard fruits   ? Tough meat, meat with gristle   ? Crunchy peanut butter   ? Nuts, seeds and popcorn   ? Millet, buckwheat, flax, oatmeal, braulio seeds   ? Dried fruits, berries, other fruits with pulp or seeds (including blueberries, raspberries, and blackberries)   ? Food containing chocolate, coconut   ? Juices with pulp     Avoiding the recommended foods is one part of helping to ensure that you have adequate preparation and that you do not need to have a repeat colonoscopy any sooner than what would be recommended by the colon cancer screening guidelines.    Please follow these instructions to ensure you have a very good prep.  The goal is for stool to be CLEAR OR YELLOW liquid - NOT BROWN.  Avoid having to repeat the procedure due to a poor prep!  Please call [phone] or [phone] if you continue to have brown stool  or have any questions about your prep instructions.     Questions regarding scheduling: Endoscopy Scheduling [phone] (M-F, 7:30am-4:30pm)   Questions about Insurance/ Financial obligations: Financial Services [phone]   Questions requiring immediate assistance: [facility] On-Call Nurse Line [phone] (After Hours)

## 2023-07-07 NOTE — DISCHARGE SUMMARY
[facility] [place] [facility] 1st Fl  Discharge Note  Short Stay    Procedure(s) (LRB):  COLONOSCOPY (N/A)      OUTCOME: Patient tolerated treatment/procedure well without complication and is now ready for discharge.    DISPOSITION: Home or Self Care    FINAL DIAGNOSIS:  Colon cancer screening    FOLLOWUP: With primary care provider    DISCHARGE INSTRUCTIONS:  No discharge procedures on file.     TIME SPENT ON DISCHARGE: 20 minutes

## 2023-07-07 NOTE — TRANSFER OF CARE
"Anesthesia Transfer of Care Note    Patient: Cb Hooper    Procedure(s) Performed: Procedure(s) (LRB):  COLONOSCOPY (N/A)    Patient location: PACU    Anesthesia Type: general    Transport from OR: Transported from OR on room air with adequate spontaneous ventilation    Post pain: adequate analgesia    Post assessment: no apparent anesthetic complications and tolerated procedure well    Post vital signs: stable    Level of consciousness: awake, alert and oriented    Nausea/Vomiting: no nausea/vomiting    Complications: none    Transfer of care protocol was followed      Last vitals:   Visit Vitals  /77 (BP Location: Right arm, Patient Position: Lying)   Pulse (!) 58   Temp 36.4 °C (97.6 °F) (Temporal)   Resp 17   Ht 5' 6" (1.676 m)   Wt 82.3 kg (181 lb 8.8 oz)   SpO2 98%   BMI 29.30 kg/m²     "

## 2023-07-13 LAB
FINAL PATHOLOGIC DIAGNOSIS: NORMAL
Lab: NORMAL

## 2023-07-14 ENCOUNTER — OFFICE VISIT (OUTPATIENT)
Dept: OPHTHALMOLOGY | Facility: CLINIC | Age: 73
End: 2023-07-14
Payer: OTHER GOVERNMENT

## 2023-07-14 DIAGNOSIS — H40.1134 PRIMARY OPEN ANGLE GLAUCOMA (POAG) OF BOTH EYES, INDETERMINATE STAGE: ICD-10-CM

## 2023-07-14 DIAGNOSIS — H25.11 NUCLEAR SCLEROSIS OF RIGHT EYE: ICD-10-CM

## 2023-07-14 DIAGNOSIS — H34.231 BRAO (BRANCH RETINAL ARTERY OCCLUSION), RIGHT: Primary | ICD-10-CM

## 2023-07-14 DIAGNOSIS — H25.12 NUCLEAR SCLEROSIS OF LEFT EYE: ICD-10-CM

## 2023-07-14 PROCEDURE — 99999 PR PBB SHADOW E&M-EST. PATIENT-LVL III: ICD-10-PCS | Mod: PBBFAC,,, | Performed by: OPHTHALMOLOGY

## 2023-07-14 PROCEDURE — 92134 CPTRZ OPH DX IMG PST SGM RTA: CPT | Mod: PBBFAC | Performed by: OPHTHALMOLOGY

## 2023-07-14 PROCEDURE — 92014 PR EYE EXAM, EST PATIENT,COMPREHESV: ICD-10-PCS | Mod: S$PBB,,, | Performed by: OPHTHALMOLOGY

## 2023-07-14 PROCEDURE — 99999 PR PBB SHADOW E&M-EST. PATIENT-LVL III: CPT | Mod: PBBFAC,,, | Performed by: OPHTHALMOLOGY

## 2023-07-14 PROCEDURE — 92014 COMPRE OPH EXAM EST PT 1/>: CPT | Mod: S$PBB,,, | Performed by: OPHTHALMOLOGY

## 2023-07-14 PROCEDURE — 92134 POSTERIOR SEGMENT OCT RETINA (OCULAR COHERENCE TOMOGRAPHY)-BOTH EYES: ICD-10-PCS | Mod: 26,S$PBB,, | Performed by: OPHTHALMOLOGY

## 2023-07-14 PROCEDURE — 99213 OFFICE O/P EST LOW 20 MIN: CPT | Mod: PBBFAC | Performed by: OPHTHALMOLOGY

## 2023-07-14 NOTE — PROGRESS NOTES
===============================  Date today is 7/14/2023  Cb Hooper is a 72 y.o. male  Last visit [facility]: :Visit date not found   Last visit eye dept. 5/31/2023    Corrected distance visual acuity was 20/50 in the right eye and 20/40 in the left eye.  Tonometry       Tonometry (Applanation, 9:35 AM)         Right Left    Pressure 14 12                  Not recorded       Not recorded       Not recorded       Chief Complaint   Patient presents with    brao     Ref by dr barnett for od brao     HPI     brao     Additional comments: Ref by dr barnett for od brao           Comments    Brao od  Coag followed by dr barnett  Ns ou          Last edited by JESSICA Polo on 7/14/2023  9:31 AM.      Problem List Items Addressed This Visit    None  Visit Diagnoses       BRAO (branch retinal artery occlusion), right    -  Primary    Relevant Orders    Posterior Segment OCT Retina-Both eyes (Completed)    Nuclear sclerosis of right eye        Nuclear sclerosis of left eye        Primary open angle glaucoma (POAG) of both eyes, indeterminate stage              Instructed to call 24/7 for any worsening of vision, visual distortion or pain.  Check OU independently daily.    Gave my office and personal cell phone number.  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 Barnett, will need carotid study- expect to remain stable  OS well perfused disc  Will follow 4-5 months after VF and carotid study    RTC as scheduled with Dr. Barnett for VF  Instructed to call 24/7 for any worsening of vision or symptoms. Check OU daily.   Gave my office and cell phone number.    =============================

## 2023-08-01 ENCOUNTER — OFFICE VISIT (OUTPATIENT)
Dept: OPHTHALMOLOGY | Facility: CLINIC | Age: 73
End: 2023-08-01
Payer: OTHER GOVERNMENT

## 2023-08-01 DIAGNOSIS — H40.1132 PRIMARY OPEN ANGLE GLAUCOMA (POAG) OF BOTH EYES, MODERATE STAGE: Primary | ICD-10-CM

## 2023-08-01 PROCEDURE — 92083 EXTENDED VISUAL FIELD XM: CPT | Mod: PBBFAC | Performed by: STUDENT IN AN ORGANIZED HEALTH CARE EDUCATION/TRAINING PROGRAM

## 2023-08-01 PROCEDURE — 99999 PR PBB SHADOW E&M-EST. PATIENT-LVL III: CPT | Mod: PBBFAC,,, | Performed by: STUDENT IN AN ORGANIZED HEALTH CARE EDUCATION/TRAINING PROGRAM

## 2023-08-01 PROCEDURE — 99213 OFFICE O/P EST LOW 20 MIN: CPT | Mod: PBBFAC | Performed by: STUDENT IN AN ORGANIZED HEALTH CARE EDUCATION/TRAINING PROGRAM

## 2023-08-01 PROCEDURE — 99999 PR PBB SHADOW E&M-EST. PATIENT-LVL III: ICD-10-PCS | Mod: PBBFAC,,, | Performed by: STUDENT IN AN ORGANIZED HEALTH CARE EDUCATION/TRAINING PROGRAM

## 2023-08-01 PROCEDURE — 92083 HUMPHREY VISUAL FIELD - OU - BOTH EYES: ICD-10-PCS | Mod: 26,S$PBB,, | Performed by: STUDENT IN AN ORGANIZED HEALTH CARE EDUCATION/TRAINING PROGRAM

## 2023-08-01 PROCEDURE — 99213 OFFICE O/P EST LOW 20 MIN: CPT | Mod: S$PBB,,, | Performed by: STUDENT IN AN ORGANIZED HEALTH CARE EDUCATION/TRAINING PROGRAM

## 2023-08-01 PROCEDURE — 99213 PR OFFICE/OUTPT VISIT, EST, LEVL III, 20-29 MIN: ICD-10-PCS | Mod: S$PBB,,, | Performed by: STUDENT IN AN ORGANIZED HEALTH CARE EDUCATION/TRAINING PROGRAM

## 2023-08-01 NOTE — Clinical Note
Urgent Referring patient who has seen you in the past, sclerotic vessel noted in macular referring for thrombotic workup including carotid doppler, echocardiogram, and consideration of CNS imagining

## 2023-08-01 NOTE — PROGRESS NOTES
HPI     Glaucoma     Additional comments: Pt states va is stable. Pt states he is using his   eye drops as directed. Pt denies any pain or irritation.          Last edited by Sandi Villegas on 8/1/2023  8:02 AM.            Assessment /Plan     For exam results, see Encounter Report.    Primary open angle glaucoma (POAG) of both eyes, moderate stage  -  IOP Controlled with no evidence of progression. Continue current treatment. Reviewed importance of continued compliance with treatment and follow up.   Continue:  Cosopt BID OU    Urgent Referral and note sent to  for thrombotic workup including carotid doppler, echocardiogram, and consideration of CNS imagining      Return to clinic with me in 4M IOP check   Return to clinic with  in 3M

## 2023-08-08 ENCOUNTER — TELEPHONE (OUTPATIENT)
Dept: INTERNAL MEDICINE | Facility: CLINIC | Age: 73
End: 2023-08-08

## 2023-08-08 NOTE — TELEPHONE ENCOUNTER
Call pt and tell him that his eye doctor has advised that he see his primary care physician to be evaluated. Find out who he has been seeing for his diabetes/HTH and primary care. Encourage him to make an appt soon. He has not seen me in 9 years so I know he is seeing someone else.

## 2023-08-09 NOTE — TELEPHONE ENCOUNTER
Spoke with his ex wife she is handling his care.  She was unaware he has not seen you for that long of a period.  As far as she is concerned, he was not seeing anyone. Appointment scheduled she will be bringing patient to appointment.

## 2023-08-09 NOTE — TELEPHONE ENCOUNTER
Left message on machine to call the clinic. Informed we need to verify if he is seeing a PCP, what doctor? Or to schedule an appt due to patient has not been seen in 9 years.

## 2023-08-15 ENCOUNTER — OFFICE VISIT (OUTPATIENT)
Dept: INTERNAL MEDICINE | Facility: CLINIC | Age: 73
End: 2023-08-15
Payer: OTHER GOVERNMENT

## 2023-08-15 VITALS
HEART RATE: 60 BPM | OXYGEN SATURATION: 98 % | TEMPERATURE: 98 F | SYSTOLIC BLOOD PRESSURE: 138 MMHG | WEIGHT: 190.25 LBS | RESPIRATION RATE: 20 BRPM | HEIGHT: 60 IN | BODY MASS INDEX: 37.35 KG/M2 | DIASTOLIC BLOOD PRESSURE: 64 MMHG

## 2023-08-15 DIAGNOSIS — E11.9 DIABETES MELLITUS WITHOUT COMPLICATION: ICD-10-CM

## 2023-08-15 DIAGNOSIS — I15.2 HYPERTENSION ASSOCIATED WITH DIABETES: ICD-10-CM

## 2023-08-15 DIAGNOSIS — Z00.00 ROUTINE GENERAL MEDICAL EXAMINATION AT HEALTH CARE FACILITY: Primary | ICD-10-CM

## 2023-08-15 DIAGNOSIS — E11.59 HYPERTENSION ASSOCIATED WITH DIABETES: ICD-10-CM

## 2023-08-15 DIAGNOSIS — Z12.5 PROSTATE CANCER SCREENING: ICD-10-CM

## 2023-08-15 PROCEDURE — 99499 UNLISTED E&M SERVICE: CPT | Mod: S$PBB,,, | Performed by: INTERNAL MEDICINE

## 2023-08-15 PROCEDURE — 99499 NO LOS: ICD-10-PCS | Mod: S$PBB,,, | Performed by: INTERNAL MEDICINE

## 2023-08-17 NOTE — PROGRESS NOTES
Pt already has an [facility] relationship [facility] and he has [facility] medical insurance and plans to continue to see her. I gave him a copy of the note from the Optometrist about the recommended workup needed. He will go today to the clinic and make arrangements to be seen.     His apt with me was canceled

## 2023-09-11 ENCOUNTER — HOSPITAL ENCOUNTER (OUTPATIENT)
Dept: RADIOLOGY | Facility: HOSPITAL | Age: 73
Discharge: HOME | End: 2023-09-11
Attending: INTERNAL MEDICINE
Payer: OTHER GOVERNMENT

## 2023-09-11 DIAGNOSIS — Z01.89 ENCOUNTER FOR OTHER SPECIFIED SPECIAL EXAMINATIONS: ICD-10-CM

## 2023-09-11 PROCEDURE — 93880 EXTRACRANIAL BILAT STUDY: CPT | Mod: 26,,, | Performed by: RADIOLOGY

## 2023-09-11 PROCEDURE — 93880 EXTRACRANIAL BILAT STUDY: CPT | Mod: TC

## 2023-09-11 PROCEDURE — 93880 US CAROTID BILATERAL: ICD-10-PCS | Mod: 26,,, | Performed by: RADIOLOGY

## 2023-12-08 ENCOUNTER — HOSPITAL ENCOUNTER (OUTPATIENT)
Dept: RADIOLOGY | Facility: HOSPITAL | Age: 73
Discharge: HOME | End: 2023-12-08
Attending: INTERNAL MEDICINE
Payer: OTHER GOVERNMENT

## 2023-12-08 DIAGNOSIS — Z01.89 ENCOUNTER FOR OTHER SPECIFIED SPECIAL EXAMINATIONS: ICD-10-CM

## 2023-12-08 PROCEDURE — 70551 MRI BRAIN STEM W/O DYE: CPT | Mod: 26,,, | Performed by: STUDENT IN AN ORGANIZED HEALTH CARE EDUCATION/TRAINING PROGRAM

## 2023-12-08 PROCEDURE — 70551 MRI BRAIN STEM W/O DYE: CPT | Mod: TC

## 2023-12-08 PROCEDURE — 70551 MRI BRAIN WITHOUT CONTRAST: ICD-10-PCS | Mod: 26,,, | Performed by: STUDENT IN AN ORGANIZED HEALTH CARE EDUCATION/TRAINING PROGRAM

## 2024-04-11 DIAGNOSIS — Z76.89 ENCOUNTER TO ESTABLISH CARE: Primary | ICD-10-CM

## 2024-04-11 DIAGNOSIS — Z00.00 ROUTINE ADULT HEALTH MAINTENANCE: ICD-10-CM

## 2024-04-11 DIAGNOSIS — R94.31 NONSPECIFIC ABNORMAL ELECTROCARDIOGRAM (ECG) (EKG): ICD-10-CM

## 2024-04-18 NOTE — PROGRESS NOTES
Subjective:   Patient ID:  Cb Hooper is a 73 y.o. male who presents for cardiac consult of Abnormal ECG      Referral by: [facility]  [address] [place] [address]     Reason for consult: abnormal ECG      HPI  The patient came in today for cardiac consult of Abnormal ECG    4/18/24  Cb Hooper is a 73 y.o. male pt with HTN, HLD, DM2, Vit D def, Obesity presents for CV eval of abnormal ECG.     Overall is active, BP 140s/70s. HR 60s.   BMI 35 - 181 lbs   HE has VICKERS at times with walking.       No cardiac monitor results found for the past 12 months         Past Medical History:   Diagnosis Date    Diabetes mellitus without complication     Hypertension associated with diabetes     Personal history of colonic polyps        Past Surgical History:   Procedure Laterality Date    APPENDECTOMY      COLONOSCOPY N/A 7/7/2023    Procedure: COLONOSCOPY;  Surgeon: Kyle Henry MD;  Location: [facility];  Service: Endoscopy;  Laterality: N/A;    KNEE SURGERY Bilateral        Social History     Tobacco Use    Smoking status: Former    Smokeless tobacco: Never       Family History   Problem Relation Name Age of Onset    Diabetes Mother         Patient's Medications   New Prescriptions    No medications on file   Previous Medications    AMLODIPINE (NORVASC) 10 MG TABLET    10 mg.    AMMONIUM LACTATE (LAC-HYDRIN) 12 % LOTION    APPLY LIBERAL AMOUNT TOPICALLY TWICE A DAY FOR DRY SKIN    CHOLECALCIFEROL, VITAMIN D3, (VITAMIN D3) 50 MCG (2,000 UNIT) TAB    50 mcg.    DICLOFENAC SODIUM (VOLTAREN) 1 % GEL    APPLY 4 GRAMS TOPICALLY FOUR TIMES A DAY AS NEEDED FOR PAIN AND INFLAMMATION. USE ENCLOSED DOSING CARD.    DORZOLAMIDE-TIMOLOL 2-0.5% (COSOPT) 22.3-6.8 MG/ML OPHTHALMIC SOLUTION    INSTILL 1 DROP IN EACH EYE TWICE A DAY FOR GLAUCOMA    EMOLLIENT COMBINATION NO.117 (EUCERIN ADVANCED REPAIR HAND) CREA    APPLY TO LEGS A SMALL AMOUNT TOPICALLY ONCE DAILY AS NEEDED FOR DRY SKIN  **APPLY TO DAMP SKIN AFTER BATH/SHOWER ROUTINELY**    LIDOCAINE (LIDODERM) 5 %    APPLY 1 PATCH TOPICALLY EVERY DAY AS NEEDED FOR PAIN WEAR FOR 12 HOURS, THEN REMOVE. DO NOT APPLY NEW PATCH FOR AT LEAST 12 HOURS    LOSARTAN (COZAAR) 100 MG TABLET    50 mg.    MELOXICAM (MOBIC) 7.5 MG TABLET    7.5 mg.    METFORMIN (GLUCOPHAGE-XR) 500 MG ER 24HR TABLET    500 mg.    PRAMOXINE 1 % LOTN    APPLY LIBERAL AMOUNT TOPICALLY  AS NEEDED KEEP IN FRIDGE, USE AS NEEDED FOR ITCHING    TRIAMCINOLONE ACETONIDE 0.1% (KENALOG) 0.1 % CREAM    APPLY SMALL AMOUNT TOPICALLY TWICE A DAY FOR ITCHING APPLY CREAM TO RED ITCHY RASH ON LEG FOR 2-4 WEEKS, STOP WHEN CLEAR.   Modified Medications    No medications on file   Discontinued Medications    No medications on file       Review of Systems   Constitutional: Negative.    HENT: Negative.     Eyes: Negative.    Respiratory:  Positive for shortness of breath.    Cardiovascular: Negative.    Gastrointestinal: Negative.    Genitourinary: Negative.    Musculoskeletal: Negative.    Skin: Negative.    Neurological: Negative.    Endo/Heme/Allergies: Negative.    Psychiatric/Behavioral: Negative.     All 12 systems otherwise negative.      Wt Readings from Last 3 Encounters:   04/19/24 82.5 kg (181 lb 14.1 oz)   08/15/23 86.3 kg (190 lb 4.1 oz)   07/07/23 82.3 kg (181 lb 8.8 oz)     Temp Readings from Last 3 Encounters:   08/15/23 98 °F (36.7 °C) (Tympanic)   07/07/23 97.8 °F (36.6 °C) (Temporal)   11/06/15 98.5 °F (36.9 °C) (Tympanic)     BP Readings from Last 3 Encounters:   04/19/24 (!) 142/78   08/15/23 138/64   07/07/23 (!) 172/84     Pulse Readings from Last 3 Encounters:   04/19/24 67   08/15/23 60   07/07/23 (!) 51       BP (!) 142/78 (BP Location: Left arm, Patient Position: Sitting, BP Method: Small (Manual))   Pulse 67   Wt 82.5 kg (181 lb 14.1 oz)   SpO2 99%   BMI 35.52 kg/m²     Objective:   Physical Exam  Vitals and nursing note reviewed.   Constitutional:       General: He is not  in acute distress.     Appearance: He is well-developed. He is obese. He is not diaphoretic.   HENT:      Head: Normocephalic and atraumatic.      Nose: Nose normal.   Eyes:      General: No scleral icterus.     Conjunctiva/sclera: Conjunctivae normal.   Neck:      Thyroid: No thyromegaly.      Vascular: No JVD.   Cardiovascular:      Rate and Rhythm: Normal rate and regular rhythm.      Heart sounds: S1 normal and S2 normal. Murmur heard.      No friction rub. No gallop. No S3 or S4 sounds.   Pulmonary:      Effort: Pulmonary effort is normal. No respiratory distress.      Breath sounds: Normal breath sounds. No stridor. No wheezing or rales.   Chest:      Chest wall: No tenderness.   Abdominal:      General: Bowel sounds are normal. There is no distension.      Palpations: Abdomen is soft. There is no mass.      Tenderness: There is no abdominal tenderness. There is no rebound.   Genitourinary:     Comments: Deferred  Musculoskeletal:         General: No tenderness or deformity. Normal range of motion.      Cervical back: Normal range of motion and neck supple.   Lymphadenopathy:      Cervical: No cervical adenopathy.   Skin:     General: Skin is warm and dry.      Coloration: Skin is not pale.      Findings: No erythema or rash.   Neurological:      Mental Status: He is alert and oriented to person, place, and time.      Motor: No abnormal muscle tone.      Coordination: Coordination normal.   Psychiatric:         Behavior: Behavior normal.         Thought Content: Thought content normal.         Judgment: Judgment normal.         Lab Results   Component Value Date     10/08/2014    K 5.0 10/08/2014     10/08/2014    CO2 23 10/08/2014    BUN 16 10/08/2014    CREATININE 1.3 04/21/2015     10/08/2014    AST 37 10/08/2014    ALT 37 10/08/2014    ALBUMIN 4.2 10/08/2014    PROT 8.0 10/08/2014    BILITOT 0.5 10/08/2014    WBC 5.88 06/24/2011    HGB 11.7 (L) 06/24/2011    HCT 36.3 (L) 06/24/2011     "MCV 89.9 06/24/2011     06/24/2011    TSH 1.330 10/08/2014    CHOL 204 (H) 10/08/2014    HDL 75 10/08/2014    LDLCALC 115.2 10/08/2014    TRIG 69 10/08/2014         No results found for: "BNP", "INR"       Assessment:      1. Nonspecific abnormal electrocardiogram (ECG) (EKG)    2. Diabetes mellitus without complication    3. Hypertension associated with diabetes    4. Vitamin D deficiency    5. Severe obesity (BMI 35.0-39.9) with comorbidity    6. VICKERS (dyspnea on exertion)        Plan:       Abnormal ECG,  VICKERS  - order exercise nuclear stress test to r/o ischemia   - order ECHO  - will discuss further eval as needed    2. HTN  - titrate meds    3. DM2  - cont tx     4. Vitamin D def  - cont Vit D    5. Obesity BMI 35  - cont weight loss    Visit today included increased complexity associated with the care of the episodic problem HTN addressed and managing the longitudinal care of the patient due to the serious and/or complex managed problem(s) .      Thank you for allowing me to participate in this patient's care. Please do not hesitate to contact me with any questions or concerns. Consult note has been forwarded to the referral physician.     "

## 2024-04-19 ENCOUNTER — OFFICE VISIT (OUTPATIENT)
Dept: CARDIOLOGY | Facility: CLINIC | Age: 74
End: 2024-04-19
Payer: OTHER GOVERNMENT

## 2024-04-19 ENCOUNTER — HOSPITAL ENCOUNTER (OUTPATIENT)
Dept: CARDIOLOGY | Facility: HOSPITAL | Age: 74
Discharge: HOME | End: 2024-04-19
Attending: INTERNAL MEDICINE
Payer: OTHER GOVERNMENT

## 2024-04-19 VITALS
SYSTOLIC BLOOD PRESSURE: 142 MMHG | WEIGHT: 181.88 LBS | BODY MASS INDEX: 35.52 KG/M2 | DIASTOLIC BLOOD PRESSURE: 78 MMHG | OXYGEN SATURATION: 99 % | HEART RATE: 67 BPM

## 2024-04-19 DIAGNOSIS — R94.31 NONSPECIFIC ABNORMAL ELECTROCARDIOGRAM (ECG) (EKG): ICD-10-CM

## 2024-04-19 DIAGNOSIS — E55.9 VITAMIN D DEFICIENCY: ICD-10-CM

## 2024-04-19 DIAGNOSIS — Z00.00 ROUTINE ADULT HEALTH MAINTENANCE: ICD-10-CM

## 2024-04-19 DIAGNOSIS — I15.2 HYPERTENSION ASSOCIATED WITH DIABETES: ICD-10-CM

## 2024-04-19 DIAGNOSIS — R94.31 NONSPECIFIC ABNORMAL ELECTROCARDIOGRAM (ECG) (EKG): Primary | ICD-10-CM

## 2024-04-19 DIAGNOSIS — E11.59 HYPERTENSION ASSOCIATED WITH DIABETES: ICD-10-CM

## 2024-04-19 DIAGNOSIS — Z76.89 ENCOUNTER TO ESTABLISH CARE: ICD-10-CM

## 2024-04-19 DIAGNOSIS — E11.9 DIABETES MELLITUS WITHOUT COMPLICATION: ICD-10-CM

## 2024-04-19 DIAGNOSIS — E66.01 SEVERE OBESITY (BMI 35.0-39.9) WITH COMORBIDITY: ICD-10-CM

## 2024-04-19 DIAGNOSIS — R06.09 DOE (DYSPNEA ON EXERTION): ICD-10-CM

## 2024-04-19 LAB
OHS QRS DURATION: 88 MS
OHS QTC CALCULATION: 408 MS

## 2024-04-19 PROCEDURE — 93005 ELECTROCARDIOGRAM TRACING: CPT

## 2024-04-19 PROCEDURE — 99213 OFFICE O/P EST LOW 20 MIN: CPT | Mod: PBBFAC,25 | Performed by: INTERNAL MEDICINE

## 2024-04-19 PROCEDURE — 99999 PR PBB SHADOW E&M-EST. PATIENT-LVL III: CPT | Mod: PBBFAC,,, | Performed by: INTERNAL MEDICINE

## 2024-04-19 PROCEDURE — G2211 COMPLEX E/M VISIT ADD ON: HCPCS | Mod: S$PBB,,, | Performed by: INTERNAL MEDICINE

## 2024-04-19 PROCEDURE — 99214 OFFICE O/P EST MOD 30 MIN: CPT | Mod: S$PBB,,, | Performed by: INTERNAL MEDICINE

## 2024-04-19 PROCEDURE — 93010 ELECTROCARDIOGRAM REPORT: CPT | Mod: ,,, | Performed by: INTERNAL MEDICINE

## 2024-06-11 ENCOUNTER — TELEPHONE (OUTPATIENT)
Dept: CARDIOLOGY | Facility: HOSPITAL | Age: 74
End: 2024-06-11
Payer: OTHER GOVERNMENT

## 2024-07-26 ENCOUNTER — HOSPITAL ENCOUNTER (OUTPATIENT)
Dept: CARDIOLOGY | Facility: HOSPITAL | Age: 74
Discharge: HOME | End: 2024-07-26
Attending: INTERNAL MEDICINE
Payer: OTHER GOVERNMENT

## 2024-07-26 ENCOUNTER — HOSPITAL ENCOUNTER (OUTPATIENT)
Dept: RADIOLOGY | Facility: HOSPITAL | Age: 74
Discharge: HOME | End: 2024-07-26
Attending: INTERNAL MEDICINE
Payer: OTHER GOVERNMENT

## 2024-07-26 VITALS
SYSTOLIC BLOOD PRESSURE: 142 MMHG | BODY MASS INDEX: 35.53 KG/M2 | WEIGHT: 181 LBS | DIASTOLIC BLOOD PRESSURE: 78 MMHG | HEIGHT: 60 IN

## 2024-07-26 DIAGNOSIS — R06.09 DOE (DYSPNEA ON EXERTION): ICD-10-CM

## 2024-07-26 DIAGNOSIS — R94.31 NONSPECIFIC ABNORMAL ELECTROCARDIOGRAM (ECG) (EKG): ICD-10-CM

## 2024-07-26 DIAGNOSIS — I15.2 HYPERTENSION ASSOCIATED WITH DIABETES: ICD-10-CM

## 2024-07-26 DIAGNOSIS — E55.9 VITAMIN D DEFICIENCY: ICD-10-CM

## 2024-07-26 DIAGNOSIS — E66.01 SEVERE OBESITY (BMI 35.0-39.9) WITH COMORBIDITY: ICD-10-CM

## 2024-07-26 DIAGNOSIS — E11.9 DIABETES MELLITUS WITHOUT COMPLICATION: ICD-10-CM

## 2024-07-26 DIAGNOSIS — E11.59 HYPERTENSION ASSOCIATED WITH DIABETES: ICD-10-CM

## 2024-07-26 LAB
AORTIC ROOT ANNULUS: 3.35 CM
ASCENDING AORTA: 3.34 CM
AV INDEX (PROSTH): 0.73
AV MEAN GRADIENT: 4 MMHG
AV PEAK GRADIENT: 9 MMHG
AV VALVE AREA BY VELOCITY RATIO: 2.13 CM²
AV VALVE AREA: 2.12 CM²
AV VELOCITY RATIO: 0.73
BSA FOR ECHO PROCEDURE: 1.86 M2
CV ECHO LV RWT: 0.3 CM
DOP CALC AO PEAK VEL: 1.47 M/S
DOP CALC AO VTI: 33.2 CM
DOP CALC LVOT AREA: 2.9 CM2
DOP CALC LVOT DIAMETER: 1.92 CM
DOP CALC LVOT PEAK VEL: 1.08 M/S
DOP CALC LVOT STROKE VOLUME: 70.32 CM3
DOP CALC RVOT PEAK VEL: 0.64 M/S
DOP CALC RVOT VTI: 16.6 CM
DOP CALCLVOT PEAK VEL VTI: 24.3 CM
E WAVE DECELERATION TIME: 178.41 MSEC
E/A RATIO: 0.74
E/E' RATIO: 9.26 M/S
ECHO LV POSTERIOR WALL: 0.83 CM (ref 0.6–1.1)
FRACTIONAL SHORTENING: 34 % (ref 28–44)
INTERVENTRICULAR SEPTUM: 0.92 CM (ref 0.6–1.1)
IVC DIAMETER: 1.62 CM
IVRT: 74.22 MSEC
LA MAJOR: 6.39 CM
LA MINOR: 6.04 CM
LA WIDTH: 4.9 CM
LEFT ATRIUM AREA SYSTOLIC (APICAL 2 CHAMBER): 20.97 CM2
LEFT ATRIUM AREA SYSTOLIC (APICAL 4 CHAMBER): 25.35 CM2
LEFT ATRIUM SIZE: 4.32 CM
LEFT ATRIUM VOLUME INDEX MOD: 38.6 ML/M2
LEFT ATRIUM VOLUME INDEX: 62.4 ML/M2
LEFT ATRIUM VOLUME MOD: 69.12 CM3
LEFT ATRIUM VOLUME: 111.74 CM3
LEFT INTERNAL DIMENSION IN SYSTOLE: 3.66 CM (ref 2.1–4)
LEFT VENTRICLE DIASTOLIC VOLUME INDEX: 82.6 ML/M2
LEFT VENTRICLE DIASTOLIC VOLUME: 147.86 ML
LEFT VENTRICLE END SYSTOLIC VOLUME APICAL 2 CHAMBER: 58.64 ML
LEFT VENTRICLE END SYSTOLIC VOLUME APICAL 4 CHAMBER: 81.68 ML
LEFT VENTRICLE MASS INDEX: 100 G/M2
LEFT VENTRICLE SYSTOLIC VOLUME INDEX: 31.6 ML/M2
LEFT VENTRICLE SYSTOLIC VOLUME: 56.5 ML
LEFT VENTRICULAR INTERNAL DIMENSION IN DIASTOLE: 5.51 CM (ref 3.5–6)
LEFT VENTRICULAR MASS: 179.79 G
LV LATERAL E/E' RATIO: 8.8 M/S
LV SEPTAL E/E' RATIO: 9.78 M/S
LVED V (TEICH): 147.86 ML
LVES V (TEICH): 56.5 ML
LVOT MG: 2.54 MMHG
LVOT MV: 0.75 CM/S
MV PEAK A VEL: 1.19 M/S
MV PEAK E VEL: 0.88 M/S
MV STENOSIS PRESSURE HALF TIME: 51.74 MS
MV VALVE AREA P 1/2 METHOD: 4.25 CM2
OHS CV RV/LV RATIO: 0.58 CM
PISA MRMAX VEL: 6.73 M/S
PISA TR MAX VEL: 3.8 M/S
PV MEAN GRADIENT: 1 MMHG
PV MV: 0.66 M/S
PV PEAK GRADIENT: 4 MMHG
PV PEAK VELOCITY: 0.96 M/S
RA MAJOR: 6.08 CM
RA PRESSURE ESTIMATED: 3 MMHG
RA WIDTH: 3.57 CM
RIGHT VENTRICULAR END-DIASTOLIC DIMENSION: 3.22 CM
RV TB RVSP: 7 MMHG
RV TISSUE DOPPLER FREE WALL SYSTOLIC VELOCITY 1 (APICAL 4 CHAMBER VIEW): 24.35 CM/S
SINUS: 3.47 CM
STJ: 2.73 CM
TDI LATERAL: 0.1 M/S
TDI SEPTAL: 0.09 M/S
TDI: 0.1 M/S
TR MAX PG: 58 MMHG
TRICUSPID ANNULAR PLANE SYSTOLIC EXCURSION: 2.21 CM
TV REST PULMONARY ARTERY PRESSURE: 61 MMHG
Z-SCORE OF LEFT VENTRICULAR DIMENSION IN END DIASTOLE: 1.08
Z-SCORE OF LEFT VENTRICULAR DIMENSION IN END SYSTOLE: 1.42

## 2024-07-26 PROCEDURE — 93017 CV STRESS TEST TRACING ONLY: CPT

## 2024-07-26 PROCEDURE — 93016 CV STRESS TEST SUPVJ ONLY: CPT | Mod: ,,, | Performed by: INTERNAL MEDICINE

## 2024-07-26 PROCEDURE — 93306 TTE W/DOPPLER COMPLETE: CPT

## 2024-07-26 PROCEDURE — 93018 CV STRESS TEST I&R ONLY: CPT | Mod: ,,, | Performed by: INTERNAL MEDICINE

## 2024-07-26 PROCEDURE — 93306 TTE W/DOPPLER COMPLETE: CPT | Mod: 26,,, | Performed by: INTERNAL MEDICINE

## 2024-07-26 PROCEDURE — 78452 HT MUSCLE IMAGE SPECT MULT: CPT | Mod: 26,,, | Performed by: INTERNAL MEDICINE

## 2024-07-26 PROCEDURE — 78452 HT MUSCLE IMAGE SPECT MULT: CPT

## 2024-07-26 PROCEDURE — A9502 TC99M TETROFOSMIN: HCPCS | Performed by: INTERNAL MEDICINE

## 2024-07-26 RX ADMIN — TETROFOSMIN 29.1 MILLICURIE: 1.38 INJECTION, POWDER, LYOPHILIZED, FOR SOLUTION INTRAVENOUS at 10:07

## 2024-07-26 RX ADMIN — TETROFOSMIN 9.2 MILLICURIE: 1.38 INJECTION, POWDER, LYOPHILIZED, FOR SOLUTION INTRAVENOUS at 08:07

## 2024-07-27 LAB
CV STRESS BASE HR: 95 BPM
DIASTOLIC BLOOD PRESSURE: 74 MMHG
OHS CV CPX 85 PERCENT MAX PREDICTED HEART RATE MALE: 123
OHS CV CPX ESTIMATED METS: 4
OHS CV CPX MAX PREDICTED HEART RATE: 145
OHS CV CPX PATIENT IS FEMALE: 0
OHS CV CPX PATIENT IS MALE: 1
OHS CV CPX PEAK DIASTOLIC BLOOD PRESSURE: 84 MMHG
OHS CV CPX PEAK HEAR RATE: 146 BPM
OHS CV CPX PEAK RATE PRESSURE PRODUCT: NORMAL
OHS CV CPX PEAK SYSTOLIC BLOOD PRESSURE: 194 MMHG
OHS CV CPX PERCENT MAX PREDICTED HEART RATE ACHIEVED: 101
OHS CV CPX RATE PRESSURE PRODUCT PRESENTING: NORMAL
STRESS ECHO POST EXERCISE DUR MIN: 5 MINUTES
STRESS ECHO POST EXERCISE DUR SEC: 0 SECONDS
STRESS ST DEPRESSION: 0.4 MM
SYSTOLIC BLOOD PRESSURE: 141 MMHG

## 2024-07-29 LAB
CV STRESS BASE HR: 95 BPM
DIASTOLIC BLOOD PRESSURE: 74 MMHG
NUC REST EJECTION FRACTION: 83
NUC STRESS EJECTION FRACTION: 72 %
OHS CV CPX 85 PERCENT MAX PREDICTED HEART RATE MALE: 123
OHS CV CPX ESTIMATED METS: 4
OHS CV CPX MAX PREDICTED HEART RATE: 145
OHS CV CPX PATIENT IS FEMALE: 0
OHS CV CPX PATIENT IS MALE: 1
OHS CV CPX PEAK DIASTOLIC BLOOD PRESSURE: 84 MMHG
OHS CV CPX PEAK HEAR RATE: 146 BPM
OHS CV CPX PEAK RATE PRESSURE PRODUCT: NORMAL
OHS CV CPX PEAK SYSTOLIC BLOOD PRESSURE: 194 MMHG
OHS CV CPX PERCENT MAX PREDICTED HEART RATE ACHIEVED: 101
OHS CV CPX RATE PRESSURE PRODUCT PRESENTING: NORMAL
STRESS ECHO POST EXERCISE DUR MIN: 5 MINUTES
STRESS ECHO POST EXERCISE DUR SEC: 0 SECONDS
STRESS ST DEPRESSION: 0.4 MM
SYSTOLIC BLOOD PRESSURE: 141 MMHG

## 2024-07-31 ENCOUNTER — PATIENT MESSAGE (OUTPATIENT)
Dept: RESEARCH | Facility: HOSPITAL | Age: 74
End: 2024-07-31
Payer: OTHER GOVERNMENT

## 2024-10-02 ENCOUNTER — HOSPITAL ENCOUNTER (INPATIENT)
Facility: HOSPITAL | Age: 74
LOS: 1 days | Discharge: HOME | DRG: 176 | End: 2024-10-03
Attending: EMERGENCY MEDICINE | Admitting: STUDENT IN AN ORGANIZED HEALTH CARE EDUCATION/TRAINING PROGRAM
Payer: MEDICARE

## 2024-10-02 DIAGNOSIS — I26.99 PULMONARY EMBOLI: Primary | ICD-10-CM

## 2024-10-02 DIAGNOSIS — R06.02 SOB (SHORTNESS OF BREATH): ICD-10-CM

## 2024-10-02 DIAGNOSIS — E11.59 HYPERTENSION ASSOCIATED WITH DIABETES: ICD-10-CM

## 2024-10-02 DIAGNOSIS — E11.9 DIABETES MELLITUS WITHOUT COMPLICATION: ICD-10-CM

## 2024-10-02 DIAGNOSIS — I15.2 HYPERTENSION ASSOCIATED WITH DIABETES: ICD-10-CM

## 2024-10-02 DIAGNOSIS — R07.9 CHEST PAIN: ICD-10-CM

## 2024-10-02 PROBLEM — L20.89 OTHER ATOPIC DERMATITIS: Status: ACTIVE | Noted: 2024-10-02

## 2024-10-02 PROBLEM — G89.29 CHRONIC PAIN: Status: ACTIVE | Noted: 2024-10-02

## 2024-10-02 PROBLEM — M54.50 LOW BACK PAIN, UNSPECIFIED: Status: ACTIVE | Noted: 2024-10-02

## 2024-10-02 PROBLEM — Z77.29 EXPOSURE TO POTENTIALLY HAZARDOUS SUBSTANCE: Status: ACTIVE | Noted: 2024-10-02

## 2024-10-02 PROBLEM — L82.1 OTHER SEBORRHEIC KERATOSIS: Status: ACTIVE | Noted: 2024-10-02

## 2024-10-02 PROBLEM — E88.810 METABOLIC SYNDROME: Status: ACTIVE | Noted: 2024-10-02

## 2024-10-02 PROBLEM — K63.5 POLYP OF COLON: Status: ACTIVE | Noted: 2024-10-02

## 2024-10-02 PROBLEM — E55.9 VITAMIN D DEFICIENCY, UNSPECIFIED: Status: ACTIVE | Noted: 2024-10-02

## 2024-10-02 PROBLEM — F41.9 ANXIETY DISORDER: Status: ACTIVE | Noted: 2024-10-02

## 2024-10-02 PROBLEM — H40.1133 PRIMARY OPEN-ANGLE GLAUCOMA, BILATERAL, SEVERE STAGE: Status: ACTIVE | Noted: 2024-10-02

## 2024-10-02 LAB
ALBUMIN SERPL BCP-MCNC: 3.8 G/DL (ref 3.5–5.2)
ALP SERPL-CCNC: 143 U/L (ref 55–135)
ALT SERPL W/O P-5'-P-CCNC: 250 U/L (ref 10–44)
ANION GAP SERPL CALC-SCNC: 8 MMOL/L (ref 8–16)
AORTIC ROOT ANNULUS: 3.81 CM
APTT PPP: 143.8 SEC (ref 21–32)
APTT PPP: 22.8 SEC (ref 21–32)
ASCENDING AORTA: 2.95 CM
AST SERPL-CCNC: 238 U/L (ref 10–40)
AV INDEX (PROSTH): 0.82
AV MEAN GRADIENT: 4.9 MMHG
AV PEAK GRADIENT: 9 MMHG
AV VALVE AREA BY VELOCITY RATIO: 3 CM²
AV VALVE AREA: 3.1 CM²
AV VELOCITY RATIO: 0.8
BASOPHILS # BLD AUTO: 0.02 K/UL (ref 0–0.2)
BASOPHILS NFR BLD: 0.3 % (ref 0–1.9)
BILIRUB SERPL-MCNC: 0.5 MG/DL (ref 0.1–1)
BILIRUB UR QL STRIP: NEGATIVE
BNP SERPL-MCNC: 162 PG/ML (ref 0–99)
BSA FOR ECHO PROCEDURE: 1.92 M2
BUN SERPL-MCNC: 11 MG/DL (ref 8–23)
CALCIUM SERPL-MCNC: 8.9 MG/DL (ref 8.7–10.5)
CHLORIDE SERPL-SCNC: 107 MMOL/L (ref 95–110)
CK SERPL-CCNC: 74 U/L (ref 20–200)
CLARITY UR: CLEAR
CO2 SERPL-SCNC: 22 MMOL/L (ref 23–29)
COLOR UR: YELLOW
CREAT SERPL-MCNC: 1.1 MG/DL (ref 0.5–1.4)
CV ECHO LV RWT: 0.51 CM
DIFFERENTIAL METHOD BLD: ABNORMAL
DOP CALC AO PEAK VEL: 1.5 M/S
DOP CALC AO VTI: 26.7 CM
DOP CALC LVOT AREA: 3.8 CM2
DOP CALC LVOT DIAMETER: 2.2 CM
DOP CALC LVOT PEAK VEL: 1.2 M/S
DOP CALC LVOT STROKE VOLUME: 83.2 CM3
DOP CALC RVOT PEAK VEL: 0.52 M/S
DOP CALC RVOT VTI: 13.6 CM
DOP CALCLVOT PEAK VEL VTI: 21.9 CM
E WAVE DECELERATION TIME: 205.33 MSEC
E/A RATIO: 0.52
E/E' RATIO: 5 M/S
ECHO LV POSTERIOR WALL: 1 CM (ref 0.6–1.1)
EOSINOPHIL # BLD AUTO: 0 K/UL (ref 0–0.5)
EOSINOPHIL NFR BLD: 0.5 % (ref 0–8)
ERYTHROCYTE [DISTWIDTH] IN BLOOD BY AUTOMATED COUNT: 12.8 % (ref 11.5–14.5)
EST. GFR  (NO RACE VARIABLE): >60 ML/MIN/1.73 M^2
ESTIMATED AVG GLUCOSE: 120 MG/DL (ref 68–131)
FRACTIONAL SHORTENING: 41 % (ref 28–44)
GLUCOSE SERPL-MCNC: 120 MG/DL (ref 70–110)
GLUCOSE UR QL STRIP: NEGATIVE
HBA1C MFR BLD: 5.8 % (ref 4–5.6)
HCT VFR BLD AUTO: 40.7 % (ref 40–54)
HCV AB SERPL QL IA: NEGATIVE
HEP C VIRUS HOLD SPECIMEN: NORMAL
HGB BLD-MCNC: 13.2 G/DL (ref 14–18)
HGB UR QL STRIP: NEGATIVE
HIV 1+2 AB+HIV1 P24 AG SERPL QL IA: NEGATIVE
IMM GRANULOCYTES # BLD AUTO: 0.03 K/UL (ref 0–0.04)
IMM GRANULOCYTES NFR BLD AUTO: 0.4 % (ref 0–0.5)
INR PPP: 1 (ref 0.8–1.2)
INR PPP: 1 (ref 0.8–1.2)
INTERVENTRICULAR SEPTUM: 1.3 CM (ref 0.6–1.1)
IVC DIAMETER: 1.49 CM
IVRT: 81.83 MSEC
KETONES UR QL STRIP: NEGATIVE
LA MAJOR: 5.38 CM
LA MINOR: 5.02 CM
LA WIDTH: 3.4 CM
LEFT ATRIUM AREA SYSTOLIC (APICAL 2 CHAMBER): 15.49 CM2
LEFT ATRIUM AREA SYSTOLIC (APICAL 4 CHAMBER): 16.12 CM2
LEFT ATRIUM SIZE: 3.25 CM
LEFT ATRIUM VOLUME INDEX MOD: 20.1 ML/M2
LEFT ATRIUM VOLUME INDEX: 25.7 ML/M2
LEFT ATRIUM VOLUME MOD: 38.25 ML
LEFT ATRIUM VOLUME: 48.78 CM3
LEFT INTERNAL DIMENSION IN SYSTOLE: 2.3 CM (ref 2.1–4)
LEFT VENTRICLE DIASTOLIC VOLUME INDEX: 35.29 ML/M2
LEFT VENTRICLE DIASTOLIC VOLUME: 67.05 ML
LEFT VENTRICLE END SYSTOLIC VOLUME APICAL 2 CHAMBER: 37.8 ML
LEFT VENTRICLE END SYSTOLIC VOLUME APICAL 4 CHAMBER: 36.5 ML
LEFT VENTRICLE MASS INDEX: 78.7 G/M2
LEFT VENTRICLE SYSTOLIC VOLUME INDEX: 9.4 ML/M2
LEFT VENTRICLE SYSTOLIC VOLUME: 17.77 ML
LEFT VENTRICULAR INTERNAL DIMENSION IN DIASTOLE: 3.9 CM (ref 3.5–6)
LEFT VENTRICULAR MASS: 149.5 G
LEUKOCYTE ESTERASE UR QL STRIP: NEGATIVE
LV LATERAL E/E' RATIO: 4.55 M/S
LV SEPTAL E/E' RATIO: 5.56 M/S
LVED V (TEICH): 67.05 ML
LVES V (TEICH): 17.77 ML
LVOT MG: 2.54 MMHG
LVOT MV: 0.76 CM/S
LYMPHOCYTES # BLD AUTO: 1.9 K/UL (ref 1–4.8)
LYMPHOCYTES NFR BLD: 24.7 % (ref 18–48)
MCH RBC QN AUTO: 28.7 PG (ref 27–31)
MCHC RBC AUTO-ENTMCNC: 32.4 G/DL (ref 32–36)
MCV RBC AUTO: 89 FL (ref 82–98)
MONOCYTES # BLD AUTO: 0.5 K/UL (ref 0.3–1)
MONOCYTES NFR BLD: 6.4 % (ref 4–15)
MV PEAK A VEL: 0.97 M/S
MV PEAK E VEL: 0.5 M/S
MV STENOSIS PRESSURE HALF TIME: 59.55 MS
MV VALVE AREA P 1/2 METHOD: 3.69 CM2
NEUTROPHILS # BLD AUTO: 5.1 K/UL (ref 1.8–7.7)
NEUTROPHILS NFR BLD: 67.7 % (ref 38–73)
NITRITE UR QL STRIP: NEGATIVE
NRBC BLD-RTO: 0 /100 WBC
OHS QRS DURATION: 70 MS
OHS QTC CALCULATION: 479 MS
PH UR STRIP: 6 [PH] (ref 5–8)
PISA TR MAX VEL: 3.95 M/S
PLATELET # BLD AUTO: 252 K/UL (ref 150–450)
PMV BLD AUTO: 8.9 FL (ref 9.2–12.9)
POCT GLUCOSE: 102 MG/DL (ref 70–110)
POCT GLUCOSE: 127 MG/DL (ref 70–110)
POCT GLUCOSE: 134 MG/DL (ref 70–110)
POTASSIUM SERPL-SCNC: 4.3 MMOL/L (ref 3.5–5.1)
PROT SERPL-MCNC: 7.6 G/DL (ref 6–8.4)
PROT UR QL STRIP: ABNORMAL
PROTHROMBIN TIME: 11.2 SEC (ref 9–12.5)
PROTHROMBIN TIME: 11.9 SEC (ref 9–12.5)
PV MEAN GRADIENT: 1 MMHG
PV PEAK GRADIENT: 1 MMHG
PV PEAK VELOCITY: 0.53 M/S
RA MAJOR: 5.05 CM
RA PRESSURE ESTIMATED: 8 MMHG
RA WIDTH: 3.1 CM
RBC # BLD AUTO: 4.6 M/UL (ref 4.6–6.2)
RIGHT VENTRICULAR END-DIASTOLIC DIMENSION: 3.85 CM
RV TB RVSP: 12 MMHG
SODIUM SERPL-SCNC: 137 MMOL/L (ref 136–145)
SP GR UR STRIP: 1.02 (ref 1–1.03)
STJ: 3.25 CM
TDI LATERAL: 0.11 M/S
TDI SEPTAL: 0.09 M/S
TDI: 0.1 M/S
TR MAX PG: 62 MMHG
TRICUSPID ANNULAR PLANE SYSTOLIC EXCURSION: 1.59 CM
TROPONIN I SERPL DL<=0.01 NG/ML-MCNC: 0.15 NG/ML (ref 0–0.03)
TROPONIN I SERPL DL<=0.01 NG/ML-MCNC: 0.17 NG/ML (ref 0–0.03)
TROPONIN I SERPL DL<=0.01 NG/ML-MCNC: 0.21 NG/ML (ref 0–0.03)
TV REST PULMONARY ARTERY PRESSURE: 70 MMHG
URN SPEC COLLECT METH UR: ABNORMAL
UROBILINOGEN UR STRIP-ACNC: NEGATIVE EU/DL
WBC # BLD AUTO: 7.48 K/UL (ref 3.9–12.7)
Z-SCORE OF LEFT VENTRICULAR DIMENSION IN END DIASTOLE: -3.13
Z-SCORE OF LEFT VENTRICULAR DIMENSION IN END SYSTOLE: -2.82

## 2024-10-02 PROCEDURE — 84484 ASSAY OF TROPONIN QUANT: CPT | Performed by: EMERGENCY MEDICINE

## 2024-10-02 PROCEDURE — 85610 PROTHROMBIN TIME: CPT | Mod: 91 | Performed by: STUDENT IN AN ORGANIZED HEALTH CARE EDUCATION/TRAINING PROGRAM

## 2024-10-02 PROCEDURE — 81003 URINALYSIS AUTO W/O SCOPE: CPT | Performed by: EMERGENCY MEDICINE

## 2024-10-02 PROCEDURE — 20000000 HC ICU ROOM

## 2024-10-02 PROCEDURE — 84484 ASSAY OF TROPONIN QUANT: CPT | Mod: 91 | Performed by: NURSE PRACTITIONER

## 2024-10-02 PROCEDURE — 85730 THROMBOPLASTIN TIME PARTIAL: CPT | Mod: 91 | Performed by: STUDENT IN AN ORGANIZED HEALTH CARE EDUCATION/TRAINING PROGRAM

## 2024-10-02 PROCEDURE — 93005 ELECTROCARDIOGRAM TRACING: CPT

## 2024-10-02 PROCEDURE — 25000003 PHARM REV CODE 250: Performed by: NURSE PRACTITIONER

## 2024-10-02 PROCEDURE — 36415 COLL VENOUS BLD VENIPUNCTURE: CPT | Mod: XB | Performed by: NURSE PRACTITIONER

## 2024-10-02 PROCEDURE — 83036 HEMOGLOBIN GLYCOSYLATED A1C: CPT | Performed by: NURSE PRACTITIONER

## 2024-10-02 PROCEDURE — 82550 ASSAY OF CK (CPK): CPT | Performed by: EMERGENCY MEDICINE

## 2024-10-02 PROCEDURE — 83880 ASSAY OF NATRIURETIC PEPTIDE: CPT | Performed by: EMERGENCY MEDICINE

## 2024-10-02 PROCEDURE — 93010 ELECTROCARDIOGRAM REPORT: CPT | Mod: ,,, | Performed by: INTERNAL MEDICINE

## 2024-10-02 PROCEDURE — 25500020 PHARM REV CODE 255: Performed by: EMERGENCY MEDICINE

## 2024-10-02 PROCEDURE — 85730 THROMBOPLASTIN TIME PARTIAL: CPT | Performed by: EMERGENCY MEDICINE

## 2024-10-02 PROCEDURE — 36415 COLL VENOUS BLD VENIPUNCTURE: CPT | Performed by: STUDENT IN AN ORGANIZED HEALTH CARE EDUCATION/TRAINING PROGRAM

## 2024-10-02 PROCEDURE — 80053 COMPREHEN METABOLIC PANEL: CPT | Performed by: EMERGENCY MEDICINE

## 2024-10-02 PROCEDURE — 87389 HIV-1 AG W/HIV-1&-2 AB AG IA: CPT | Performed by: EMERGENCY MEDICINE

## 2024-10-02 PROCEDURE — 85025 COMPLETE CBC W/AUTO DIFF WBC: CPT | Performed by: EMERGENCY MEDICINE

## 2024-10-02 PROCEDURE — 99285 EMERGENCY DEPT VISIT HI MDM: CPT | Mod: 25

## 2024-10-02 PROCEDURE — 85610 PROTHROMBIN TIME: CPT | Performed by: EMERGENCY MEDICINE

## 2024-10-02 PROCEDURE — 86803 HEPATITIS C AB TEST: CPT | Performed by: EMERGENCY MEDICINE

## 2024-10-02 PROCEDURE — 36415 COLL VENOUS BLD VENIPUNCTURE: CPT | Performed by: EMERGENCY MEDICINE

## 2024-10-02 PROCEDURE — 63600175 PHARM REV CODE 636 W HCPCS: Performed by: EMERGENCY MEDICINE

## 2024-10-02 RX ORDER — INSULIN ASPART 100 [IU]/ML
0-5 INJECTION, SOLUTION INTRAVENOUS; SUBCUTANEOUS
Status: DISCONTINUED | OUTPATIENT
Start: 2024-10-02 | End: 2024-10-03 | Stop reason: HOSPADM

## 2024-10-02 RX ORDER — ONDANSETRON HYDROCHLORIDE 2 MG/ML
4 INJECTION, SOLUTION INTRAVENOUS EVERY 8 HOURS PRN
Status: DISCONTINUED | OUTPATIENT
Start: 2024-10-02 | End: 2024-10-03 | Stop reason: HOSPADM

## 2024-10-02 RX ORDER — ALUMINUM HYDROXIDE, MAGNESIUM HYDROXIDE, AND SIMETHICONE 1200; 120; 1200 MG/30ML; MG/30ML; MG/30ML
30 SUSPENSION ORAL 4 TIMES DAILY PRN
Status: DISCONTINUED | OUTPATIENT
Start: 2024-10-02 | End: 2024-10-03 | Stop reason: HOSPADM

## 2024-10-02 RX ORDER — CHLORHEXIDINE GLUCONATE ORAL RINSE 1.2 MG/ML
15 SOLUTION DENTAL 2 TIMES DAILY
Status: DISCONTINUED | OUTPATIENT
Start: 2024-10-02 | End: 2024-10-03

## 2024-10-02 RX ORDER — GLUCAGON 1 MG
1 KIT INJECTION
Status: DISCONTINUED | OUTPATIENT
Start: 2024-10-02 | End: 2024-10-03 | Stop reason: HOSPADM

## 2024-10-02 RX ORDER — IBUPROFEN 200 MG
16 TABLET ORAL
Status: DISCONTINUED | OUTPATIENT
Start: 2024-10-02 | End: 2024-10-03 | Stop reason: HOSPADM

## 2024-10-02 RX ORDER — SODIUM CHLORIDE 0.9 % (FLUSH) 0.9 %
10 SYRINGE (ML) INJECTION EVERY 12 HOURS PRN
Status: DISCONTINUED | OUTPATIENT
Start: 2024-10-02 | End: 2024-10-03 | Stop reason: HOSPADM

## 2024-10-02 RX ORDER — NALOXONE HCL 0.4 MG/ML
0.02 VIAL (ML) INJECTION
Status: DISCONTINUED | OUTPATIENT
Start: 2024-10-02 | End: 2024-10-03 | Stop reason: HOSPADM

## 2024-10-02 RX ORDER — BISACODYL 10 MG/1
10 SUPPOSITORY RECTAL DAILY PRN
Status: DISCONTINUED | OUTPATIENT
Start: 2024-10-02 | End: 2024-10-03 | Stop reason: HOSPADM

## 2024-10-02 RX ORDER — DORZOLAMIDE HYDROCHLORIDE AND TIMOLOL MALEATE 20; 5 MG/ML; MG/ML
1 SOLUTION/ DROPS OPHTHALMIC 2 TIMES DAILY
Status: DISCONTINUED | OUTPATIENT
Start: 2024-10-02 | End: 2024-10-03 | Stop reason: HOSPADM

## 2024-10-02 RX ORDER — ACETAMINOPHEN 325 MG/1
650 TABLET ORAL EVERY 4 HOURS PRN
Status: DISCONTINUED | OUTPATIENT
Start: 2024-10-02 | End: 2024-10-03 | Stop reason: HOSPADM

## 2024-10-02 RX ORDER — MIRTAZAPINE 7.5 MG/1
7.5 TABLET, FILM COATED ORAL NIGHTLY
Status: DISCONTINUED | OUTPATIENT
Start: 2024-10-02 | End: 2024-10-03 | Stop reason: HOSPADM

## 2024-10-02 RX ORDER — LOSARTAN POTASSIUM 50 MG/1
50 TABLET ORAL DAILY
Status: DISCONTINUED | OUTPATIENT
Start: 2024-10-03 | End: 2024-10-03 | Stop reason: HOSPADM

## 2024-10-02 RX ORDER — IBUPROFEN 200 MG
24 TABLET ORAL
Status: DISCONTINUED | OUTPATIENT
Start: 2024-10-02 | End: 2024-10-03 | Stop reason: HOSPADM

## 2024-10-02 RX ORDER — MORPHINE SULFATE 4 MG/ML
2 INJECTION, SOLUTION INTRAMUSCULAR; INTRAVENOUS EVERY 4 HOURS PRN
Status: DISCONTINUED | OUTPATIENT
Start: 2024-10-02 | End: 2024-10-03 | Stop reason: HOSPADM

## 2024-10-02 RX ORDER — HEPARIN SODIUM,PORCINE/D5W 25000/250
0-40 INTRAVENOUS SOLUTION INTRAVENOUS CONTINUOUS
Status: DISCONTINUED | OUTPATIENT
Start: 2024-10-02 | End: 2024-10-03

## 2024-10-02 RX ORDER — TALC
6 POWDER (GRAM) TOPICAL NIGHTLY PRN
Status: DISCONTINUED | OUTPATIENT
Start: 2024-10-02 | End: 2024-10-03 | Stop reason: HOSPADM

## 2024-10-02 RX ORDER — AMLODIPINE BESYLATE 10 MG/1
10 TABLET ORAL DAILY
Status: DISCONTINUED | OUTPATIENT
Start: 2024-10-03 | End: 2024-10-03 | Stop reason: HOSPADM

## 2024-10-02 RX ORDER — MUPIROCIN 20 MG/G
OINTMENT TOPICAL 2 TIMES DAILY
Status: DISCONTINUED | OUTPATIENT
Start: 2024-10-02 | End: 2024-10-03 | Stop reason: HOSPADM

## 2024-10-02 RX ORDER — MIRTAZAPINE 15 MG/1
7.5 TABLET, FILM COATED ORAL NIGHTLY
COMMUNITY
Start: 2024-02-09

## 2024-10-02 RX ADMIN — IOHEXOL 100 ML: 350 INJECTION, SOLUTION INTRAVENOUS at 12:10

## 2024-10-02 RX ADMIN — MIRTAZAPINE 7.5 MG: 7.5 TABLET, FILM COATED ORAL at 09:10

## 2024-10-02 RX ADMIN — HEPARIN SODIUM 18 UNITS/KG/HR: 10000 INJECTION, SOLUTION INTRAVENOUS at 03:10

## 2024-10-02 RX ADMIN — DORZOLAMIDE HYDROCHLORIDE AND TIMOLOL MALEATE 1 DROP: 22.3; 6.8 SOLUTION/ DROPS OPHTHALMIC at 09:10

## 2024-10-02 RX ADMIN — CHLORHEXIDINE GLUCONATE 0.12% ORAL RINSE 15 ML: 1.2 LIQUID ORAL at 09:10

## 2024-10-02 RX ADMIN — MUPIROCIN: 20 OINTMENT TOPICAL at 09:10

## 2024-10-02 NOTE — NURSING
Pt received from ER, heparin gtt infusing.   NADN, VSS, pt ambulated from stretcher to bed without assistance. Foam placed to sacrum. Bed locked and in lowest position, call bell within reach, bed alarm set.

## 2024-10-02 NOTE — ED PROVIDER NOTES
SCRIBE #1 NOTE: I, Carolyn Cooper, am scribing for, and in the presence of, Dayron Styles MD. I have scribed the entire note.       History     Chief Complaint   Patient presents with    Shortness of Breath     Patient presents to ED with c/o SOB and left-sided chest pain that started last week. Denies nausea/vomiting.      Review of patient's allergies indicates:  No Known Allergies      History of Present Illness     HPI    10/2/2024, 9:35 AM  History obtained from the patient      History of Present Illness: Cb Hooper is a 73 y.o. male patient with a PMHx of DM, HTN, and colonic polyps who presents to the Emergency Department for evaluation of SOB which onset gradually 1 week ago. Symptoms are constant and moderate in severity. No mitigating or exacerbating factors reported. Associated sxs include chest pain. Patient denies any n/v or fever. No prior treatment specified. No further complaints or concerns at this time.       Arrival mode: Personal Transportation    PCP: Catrina, Primary Doctor        Past Medical History:  Past Medical History:   Diagnosis Date    Diabetes mellitus without complication     Hypertension associated with diabetes     Personal history of colonic polyps        Past Surgical History:  Past Surgical History:   Procedure Laterality Date    APPENDECTOMY      COLONOSCOPY N/A 7/7/2023    Procedure: COLONOSCOPY;  Surgeon: Kyle Henry MD;  Location: [facility];  Service: Endoscopy;  Laterality: N/A;    KNEE SURGERY Bilateral          Family History:  Family History   Problem Relation Name Age of Onset    Diabetes Mother         Social History:  Social History     Tobacco Use    Smoking status: Former    Smokeless tobacco: Never   Substance and Sexual Activity    Alcohol use: Not on file    Drug use: Not on file    Sexual activity: Not on file        Review of Systems     Review of Systems   Constitutional:  Negative for fever.   HENT:  Negative for sore throat.    Respiratory:   Positive for shortness of breath.    Cardiovascular:  Positive for chest pain.   Gastrointestinal:  Negative for nausea and vomiting.   Genitourinary:  Negative for dysuria.   Musculoskeletal:  Negative for back pain.   Skin:  Negative for rash.   Neurological:  Negative for weakness.   Hematological:  Does not bruise/bleed easily.   All other systems reviewed and are negative.     Physical Exam     Initial Vitals [10/02/24 0925]   BP Pulse Resp Temp SpO2   130/82 93 18 98.2 °F (36.8 °C) 98 %      MAP       --          Physical Exam  Nursing Notes and Vital Signs Reviewed.  Constitutional: Patient is in mild distress. Well-developed and well-nourished.  Head: Atraumatic. Normocephalic.  Eyes: PERRL. EOM intact. Conjunctivae are not pale. No scleral icterus.  ENT: Mucous membranes are moist. Oropharynx is clear and symmetric.    Neck: Supple. Full ROM. No lymphadenopathy.  Cardiovascular: Regular rate. Regular rhythm. No murmurs, rubs, or gallops. Distal pulses are 2+ and symmetric.  Pulmonary/Chest: Mild respiratory distress; bradypnea. Clear to auscultation bilaterally. No wheezing or rales.  Abdominal: Soft and non-distended.  There is no tenderness.  No rebound, guarding, or rigidity. Good bowel sounds.  Genitourinary: No CVA tenderness  Musculoskeletal: Moves all extremities. No obvious deformities. No edema. No calf tenderness.  Skin: Warm and dry.  Neurological:  Alert, awake, and appropriate.  Normal speech.  No acute focal neurological deficits are appreciated.  Psychiatric: Normal affect. Good eye contact. Appropriate in content.     ED Course   Critical Care    Date/Time: 10/2/2024 1:12 PM    Performed by: Dayron Styles MD  Authorized by: Dayron Styles MD  Direct patient critical care time: 10 minutes  Additional history critical care time: 12 minutes  Ordering / reviewing critical care time: 9 minutes  Documentation critical care time: 11 minutes  Consulting other physicians critical care  "time: 8 minutes  Consult with family critical care time: 10 minutes  Total critical care time (exclusive of procedural time) : 60 minutes  Critical care was necessary to treat or prevent imminent or life-threatening deterioration of the following conditions: respiratory failure.  Critical care was time spent personally by me on the following activities: blood draw for specimens, development of treatment plan with patient or surrogate, interpretation of cardiac output measurements, evaluation of patient's response to treatment, examination of patient, ordering and review of radiographic studies, re-evaluation of patient's condition, pulse oximetry, review of old charts and discussions with consultants.        ED Vital Signs:  Vitals:    10/02/24 0925 10/02/24 1000 10/02/24 1045 10/02/24 1100   BP: 130/82 133/77 (!) 145/88 (!) 149/81   Pulse: 93 83 80 81   Resp: 18 20 20 18   Temp: 98.2 °F (36.8 °C)      TempSrc: Oral      SpO2: 98% 100% 98% 97%   Weight: 78.4 kg (172 lb 13.5 oz)      Height: 5' 7" (1.702 m)       10/02/24 1130   BP: (!) 141/84   Pulse: 80   Resp: 17   Temp:    TempSrc:    SpO2: 97%   Weight:    Height:        Abnormal Lab Results:  Labs Reviewed   URINALYSIS, REFLEX TO URINE CULTURE - Abnormal       Result Value    Specimen UA Urine, Clean Catch      Color, UA Yellow      Appearance, UA Clear      pH, UA 6.0      Specific Gravity, UA 1.020      Protein, UA Trace (*)     Glucose, UA Negative      Ketones, UA Negative      Bilirubin (UA) Negative      Occult Blood UA Negative      Nitrite, UA Negative      Urobilinogen, UA Negative      Leukocytes, UA Negative      Narrative:     Specimen Source->Urine   B-TYPE NATRIURETIC PEPTIDE - Abnormal     (*)     Narrative:     Release to patient->Immediate   CBC W/ AUTO DIFFERENTIAL - Abnormal    WBC 7.48      RBC 4.60      Hemoglobin 13.2 (*)     Hematocrit 40.7      MCV 89      MCH 28.7      MCHC 32.4      RDW 12.8      Platelets 252      MPV 8.9 (*)  "    Immature Granulocytes 0.4      Gran # (ANC) 5.1      Immature Grans (Abs) 0.03      Lymph # 1.9      Mono # 0.5      Eos # 0.0      Baso # 0.02      nRBC 0      Gran % 67.7      Lymph % 24.7      Mono % 6.4      Eosinophil % 0.5      Basophil % 0.3      Differential Method Automated     COMPREHENSIVE METABOLIC PANEL - Abnormal    Sodium 137      Potassium 4.3      Chloride 107      CO2 22 (*)     Glucose 120 (*)     BUN 11      Creatinine 1.1      Calcium 8.9      Total Protein 7.6      Albumin 3.8      Total Bilirubin 0.5      Alkaline Phosphatase 143 (*)      (*)      (*)     eGFR >60      Anion Gap 8     TROPONIN I - Abnormal    Troponin I 0.209 (*)    HIV 1 / 2 ANTIBODY    HIV 1/2 Ag/Ab Negative      Narrative:     Release to patient->Immediate   HEPATITIS C ANTIBODY    Hepatitis C Ab Negative      Narrative:     Release to patient->Immediate   HEP C VIRUS HOLD SPECIMEN    HEP C Virus Hold Specimen Hold for HCV sendout      Narrative:     Release to patient->Immediate   CK    CPK 74     APTT   PROTIME-INR   TROPONIN I        All Lab Results:  Results for orders placed or performed during the hospital encounter of 10/02/24   EKG 12-lead    Collection Time: 10/02/24  9:27 AM   Result Value Ref Range    QRS Duration 70 ms    [facility] QTC Calculation 479 ms   BNP    Collection Time: 10/02/24  9:56 AM   Result Value Ref Range     (H) 0 - 99 pg/mL   HIV 1/2 Ag/Ab (4th Gen)    Collection Time: 10/02/24  9:56 AM   Result Value Ref Range    HIV 1/2 Ag/Ab Negative Negative   Hepatitis C Antibody    Collection Time: 10/02/24  9:56 AM   Result Value Ref Range    Hepatitis C Ab Negative Negative   HCV Virus Hold Specimen    Collection Time: 10/02/24  9:56 AM   Result Value Ref Range    HEP C Virus Hold Specimen Hold for HCV sendout    Urinalysis, Reflex to Urine Culture Urine, Clean Catch    Collection Time: 10/02/24 10:10 AM    Specimen: Urine   Result Value Ref Range    Specimen UA Urine, Clean Catch      Color, UA Yellow Yellow, Straw, Iris    Appearance, UA Clear Clear    pH, UA 6.0 5.0 - 8.0    Specific Gravity, UA 1.020 1.005 - 1.030    Protein, UA Trace (A) Negative    Glucose, UA Negative Negative    Ketones, UA Negative Negative    Bilirubin (UA) Negative Negative    Occult Blood UA Negative Negative    Nitrite, UA Negative Negative    Urobilinogen, UA Negative <2.0 EU/dL    Leukocytes, UA Negative Negative   CBC auto differential    Collection Time: 10/02/24 10:41 AM   Result Value Ref Range    WBC 7.48 3.90 - 12.70 K/uL    RBC 4.60 4.60 - 6.20 M/uL    Hemoglobin 13.2 (L) 14.0 - 18.0 g/dL    Hematocrit 40.7 40.0 - 54.0 %    MCV 89 82 - 98 fL    MCH 28.7 27.0 - 31.0 pg    MCHC 32.4 32.0 - 36.0 g/dL    RDW 12.8 11.5 - 14.5 %    Platelets 252 150 - 450 K/uL    MPV 8.9 (L) 9.2 - 12.9 fL    Immature Granulocytes 0.4 0.0 - 0.5 %    Gran # (ANC) 5.1 1.8 - 7.7 K/uL    Immature Grans (Abs) 0.03 0.00 - 0.04 K/uL    Lymph # 1.9 1.0 - 4.8 K/uL    Mono # 0.5 0.3 - 1.0 K/uL    Eos # 0.0 0.0 - 0.5 K/uL    Baso # 0.02 0.00 - 0.20 K/uL    nRBC 0 0 /100 WBC    Gran % 67.7 38.0 - 73.0 %    Lymph % 24.7 18.0 - 48.0 %    Mono % 6.4 4.0 - 15.0 %    Eosinophil % 0.5 0.0 - 8.0 %    Basophil % 0.3 0.0 - 1.9 %    Differential Method Automated    Comprehensive metabolic panel    Collection Time: 10/02/24 10:41 AM   Result Value Ref Range    Sodium 137 136 - 145 mmol/L    Potassium 4.3 3.5 - 5.1 mmol/L    Chloride 107 95 - 110 mmol/L    CO2 22 (L) 23 - 29 mmol/L    Glucose 120 (H) 70 - 110 mg/dL    BUN 11 8 - 23 mg/dL    Creatinine 1.1 0.5 - 1.4 mg/dL    Calcium 8.9 8.7 - 10.5 mg/dL    Total Protein 7.6 6.0 - 8.4 g/dL    Albumin 3.8 3.5 - 5.2 g/dL    Total Bilirubin 0.5 0.1 - 1.0 mg/dL    Alkaline Phosphatase 143 (H) 55 - 135 U/L     (H) 10 - 40 U/L     (H) 10 - 44 U/L    eGFR >60 >60 mL/min/1.73 m^2    Anion Gap 8 8 - 16 mmol/L   Troponin I    Collection Time: 10/02/24 10:41 AM   Result Value Ref Range    Troponin I  0.209 (H) 0.000 - 0.026 ng/mL   CPK    Collection Time: 10/02/24 10:41 AM   Result Value Ref Range    CPK 74 20 - 200 U/L       Imaging Results:  Imaging Results              US Lower Extremity Veins Bilateral (No Result on File)                       CTA Chest Non-Coronary (PE Studies) (Final result)  Result time 10/02/24 12:55:36      Final result by Fran Cortes ([facility]), MD (10/02/24 12:55:36)                   Impression:      Extensive bilateral pulmonary emboli involving the main pulmonary arteries extending to all lobar branches and into segmental branches.  Right ventricle strain is suspected.    This report was flagged in Epic as abnormal.    All CT scans at this facility use dose modulation, iterative reconstruction and/or weight based dosing when appropriate to reduce radiation dose to as low as reasonably achievable.      Electronically signed by: Fran Cortes MD  Date:    10/02/2024  Time:    12:55               Narrative:    EXAMINATION:  CTA CHEST NON CORONARY (PE STUDIES)    CLINICAL HISTORY:  PE suspected, intermediate prob, neg D-dimer;    TECHNIQUE:  Standard CTA of the chest performed with 100 cc Omnipaque 350 utilizing 3-D MIP reformations.    COMPARISON:  None    FINDINGS:  Extensive bilateral pulmonary embolism involving the main pulmonary arteries bilaterally and extending into the lobar and segmental branches.  Right ventricle strain suspected.    There are coronary artery calcifications present.  No pericardial effusion.  Limited opacification of the thoracic aorta.  No evidence of aortic aneurysm.    No evidence of mediastinal or hilar adenopathy.    Lung fields are clear.    Upper abdominal images are negative.                                       X-Ray Chest AP Portable (Final result)  Result time 10/02/24 09:54:51      Final result by Hortencia Kern MD (10/02/24 09:54:51)                   Impression:      No acute abnormality.      Electronically signed by: Hortencia  Erlin  Date:    10/02/2024  Time:    09:54               Narrative:    EXAMINATION:  XR CHEST AP PORTABLE    CLINICAL HISTORY:  sob;    TECHNIQUE:  Single frontal view of the chest was performed.    COMPARISON:  None    FINDINGS:  The lungs are clear, with normal appearance of pulmonary vasculature and no pleural effusion or pneumothorax.    The cardiac silhouette is normal in size. The hilar and mediastinal contours are unremarkable.    Bones are intact.  There is degenerative change of the shoulders and spine.                                       The EKG was ordered, reviewed, and independently interpreted by the ED provider.  Interpretation time: 9:27  Rate: 89 BPM  Rhythm: normal sinus rhythm  Interpretation: Anterior infarct, age undetermined.   T wave abnormality, consider lateral ischemia. No STEMI.         The Emergency Provider reviewed the vital signs and test results, which are outlined above.     ED Discussion     1:25 PM: Discussed case with Mirlande Lino NP ([facility]) who agrees with current care and management of pt and accepts admission.   Admitting Service: Hospital Medicine  Admitting Physician: Dr. Jamison  Admit to: Inpatient: tele    1:27 PM: Re-evaluated pt. I have discussed test results, shared treatment plan, and the need for admission with patient/family at bedside. Pt/family express understanding at this time and agree with all information. All questions answered. Pt/family member(s) have no further questions or concerns at this time. Pt is ready for admit.       Medical Decision Making  DDx: SOB< CHF, PE    Amount and/or Complexity of Data Reviewed  Labs: ordered. Decision-making details documented in ED Course.  Radiology: ordered. Decision-making details documented in ED Course.  ECG/medicine tests: ordered and independent interpretation performed. Decision-making details documented in ED Course.  Discussion of management or test interpretation with external  provider(s): Radiologist reports Bilateral PEs Discussed with Dr. Cat (IR) recommends heparin, admission to , pulmonary consult, echo, and they will see the patient and evaluate for intervention.    Discussed case with Mirlande Lino () Dr. Jamison will admit to inatpeint. Tele.     Risk  Prescription drug management.  Decision regarding hospitalization.                ED Medication(s):  Medications   heparin 25,000 units in dextrose 5% (100 units/ml) IV bolus from bag HIGH INTENSITY nomogram - [facility] (has no administration in time range)   heparin 25,000 units in dextrose 5% 250 mL (100 units/mL) infusion HIGH INTENSITY nomogram - [facility] (has no administration in time range)   heparin 25,000 units in dextrose 5% (100 units/ml) IV bolus from bag HIGH INTENSITY nomogram - [facility] (has no administration in time range)   heparin 25,000 units in dextrose 5% (100 units/ml) IV bolus from bag HIGH INTENSITY nomogram - [facility] (has no administration in time range)   sodium chloride 0.9% flush 10 mL (has no administration in time range)   melatonin tablet 6 mg (has no administration in time range)   ondansetron injection 4 mg (has no administration in time range)   bisacodyL suppository 10 mg (has no administration in time range)   aluminum-magnesium hydroxide-simethicone 200-200-20 mg/5 mL suspension 30 mL (has no administration in time range)   acetaminophen tablet 650 mg (has no administration in time range)   morphine injection 2 mg (has no administration in time range)   naloxone 0.4 mg/mL injection 0.02 mg (has no administration in time range)   glucose chewable tablet 16 g (has no administration in time range)   glucose chewable tablet 24 g (has no administration in time range)   glucagon (human recombinant) injection 1 mg (has no administration in time range)   dextrose 10% bolus 125 mL 125 mL (has no administration in time range)   dextrose 10% bolus 250 mL 250 mL (has no administration in time range)   iohexoL (OMNIPAQUE  350) injection 100 mL (100 mLs Intravenous Given 10/2/24 1230)       New Prescriptions    No medications on file               Scribe Attestation:   Scribe #1: I performed the above scribed service and the documentation accurately describes the services I performed. I attest to the accuracy of the note.     Attending:   Physician Attestation Statement for Scribe #1: I, Dayron Styles MD, personally performed the services described in this documentation, as scribed by Carolyn Cooper, in my presence, and it is both accurate and complete.           Clinical Impression       ICD-10-CM ICD-9-CM   1. Pulmonary emboli  I26.99 415.19   2. SOB (shortness of breath)  R06.02 786.05   3. Chest pain  R07.9 786.50       Disposition:   Disposition: Admitted  Condition: Stable       Dayron Styles MD  10/02/24 [id number]

## 2024-10-02 NOTE — HPI
73-year-old male with a known past medical history of diabetes, hypertension, and colon polyps that presented to the ER 10/2 for complaints of shortness of breath and left-sided chest pain has been going on for about 1 week. He also reports swelling of his left leg that has now resolved. Workup in the ER revealed elevated BNP, elevated troponin, and slightly elevated LFTs.  Patient underwent CTA of the chest which revealed extensive bilateral pulmonary emboli involving the main pulmonary arteries extending to all lobar branches and into segmental branches.  Patient initiated on heparin infusion.  Stat echo performed shows right heart strain. Vital signs stable and patient on room air.  No planned interventions per IR at this time.  Patient being admitted to the ICU for close monitoring.    At the time of my exam in the ICU, pt is awake and alert on RA in NAD watching TV and visiting with family. He is able to provide history.

## 2024-10-02 NOTE — ASSESSMENT & PLAN NOTE
- CTA reviewed  - BNP and trop bumped, trend trops  - echo with right heart strain and preserved function  - heparin gtt  - DVT BLE pending, follow results  - has been eval per IR, no plans for intervention currently, monitors for changes

## 2024-10-02 NOTE — CONSULTS
Patient seen by Dr. Cat. Evaluation for pulmonary embolus.  No intervention indicated at this time.  Recommend to continue heparin therapy and continue to monitor.  Will await Echo scan to check for heart strain.  Recommend US BLE to evaluate for DVT.  Please call IR if patient's condition worsens.      Impression/Plan  Pulmonary embolus: patient hemodynamically stable and in no acute distress.  Recommend heparin therapy and monitor efficacy.  No intervention indicated at this time.    Thank you for the consult

## 2024-10-02 NOTE — SUBJECTIVE & OBJECTIVE
Past Medical History:   Diagnosis Date    Diabetes mellitus without complication     Hypertension associated with diabetes     Personal history of colonic polyps        Past Surgical History:   Procedure Laterality Date    APPENDECTOMY      COLONOSCOPY N/A 7/7/2023    Procedure: COLONOSCOPY;  Surgeon: Kyle Henry MD;  Location: [facility];  Service: Endoscopy;  Laterality: N/A;    KNEE SURGERY Bilateral        Review of patient's allergies indicates:  No Known Allergies    Family History       Problem Relation (Age of Onset)    Diabetes Mother          Tobacco Use    Smoking status: Former    Smokeless tobacco: Never   Substance and Sexual Activity    Alcohol use: Not Currently    Drug use: Never    Sexual activity: Not Currently         Review of Systems   Constitutional:  Positive for activity change and fatigue. Negative for fever.   Respiratory:  Positive for shortness of breath (with exertion). Negative for cough and chest tightness.    Cardiovascular:  Positive for leg swelling (L and now resolved). Negative for chest pain.   All other systems reviewed and are negative.    Objective:     Vital Signs (Most Recent):  Temp: 98.4 °F (36.9 °C) (10/02/24 1600)  Pulse: 89 (10/02/24 1615)  Resp: 20 (10/02/24 1615)  BP: (!) 163/96 (10/02/24 1615)  SpO2: 99 % (10/02/24 1615) Vital Signs (24h Range):  Temp:  [98.2 °F (36.8 °C)-98.4 °F (36.9 °C)] 98.4 °F (36.9 °C)  Pulse:  [80-93] 89  Resp:  [17-21] 20  SpO2:  [97 %-100 %] 99 %  BP: (130-167)/(77-99) 163/96     Weight: 72.4 kg (159 lb 9.8 oz)  Body mass index is 25 kg/m².      Intake/Output Summary (Last 24 hours) at 10/2/2024 1658  Last data filed at 10/2/2024 1624  Gross per 24 hour   Intake 74.88 ml   Output --   Net 74.88 ml        Physical Exam  Vitals reviewed.   Constitutional:       General: He is awake. He is not in acute distress.     Appearance: He is well-developed. He is not ill-appearing.      Comments: On RA   Cardiovascular:      Rate and Rhythm:  Normal rate.      Pulses:           Radial pulses are 2+ on the right side and 2+ on the left side.   Pulmonary:      Effort: Pulmonary effort is normal.      Breath sounds: Normal breath sounds. No wheezing, rhonchi or rales.   Abdominal:      General: There is no distension.      Palpations: Abdomen is soft.   Musculoskeletal:      Right lower leg: No edema.      Left lower leg: No edema.   Skin:     General: Skin is warm and dry.   Neurological:      General: No focal deficit present.      Mental Status: He is alert.   Psychiatric:         Behavior: Behavior is cooperative.               Lines/Drains/Airways       Peripheral Intravenous Line  Duration                  Peripheral IV - Single Lumen 10/02/24 1240 20 G Anterior;Right Forearm <1 day                    Significant Labs:    CBC/Anemia Profile:  Recent Labs   Lab 10/02/24  1041   WBC 7.48   HGB 13.2*   HCT 40.7      MCV 89   RDW 12.8        Chemistries:  Recent Labs   Lab 10/02/24  1041      K 4.3      CO2 22*   BUN 11   CREATININE 1.1   CALCIUM 8.9   ALBUMIN 3.8   PROT 7.6   BILITOT 0.5   ALKPHOS 143*   *   *       All pertinent labs within the past 24 hours have been reviewed.    Significant Imaging:   I have reviewed all pertinent imaging results/findings within the past 24 hours.

## 2024-10-02 NOTE — H&P
[facility])  Critical Care Medicine  History & Physical    Patient Name: Cb Hooper  MRN: [id number]  Admission Date: 10/2/2024  Hospital Length of Stay: 0 days  Code Status: Full Code  Attending Physician: Ammon Mariee MD   Primary Care Provider: No, Primary Doctor   Principal Problem: Acute pulmonary embolism    Subjective:     HPI:  73-year-old male with a known past medical history of diabetes, hypertension, and colon polyps that presented to the ER 10/2 for complaints of shortness of breath and left-sided chest pain has been going on for about 1 week. He also reports swelling of his left leg that has now resolved. Workup in the ER revealed elevated BNP, elevated troponin, and slightly elevated LFTs.  Patient underwent CTA of the chest which revealed extensive bilateral pulmonary emboli involving the main pulmonary arteries extending to all lobar branches and into segmental branches.  Patient initiated on heparin infusion.  Stat echo performed shows right heart strain. Vital signs stable and patient on room air.  No planned interventions per IR at this time.  Patient being admitted to the ICU for close monitoring.    At the time of my exam in the ICU, pt is awake and alert on RA in NAD watching TV and visiting with family. He is able to provide history.     Hospital/ICU Course:  No notes on file     Past Medical History:   Diagnosis Date    Diabetes mellitus without complication     Hypertension associated with diabetes     Personal history of colonic polyps        Past Surgical History:   Procedure Laterality Date    APPENDECTOMY      COLONOSCOPY N/A 7/7/2023    Procedure: COLONOSCOPY;  Surgeon: Kyle Henry MD;  Location: [facility];  Service: Endoscopy;  Laterality: N/A;    KNEE SURGERY Bilateral        Review of patient's allergies indicates:  No Known Allergies    Family History       Problem Relation (Age of Onset)    Diabetes Mother          Tobacco Use     Smoking status: Former    Smokeless tobacco: Never   Substance and Sexual Activity    Alcohol use: Not Currently    Drug use: Never    Sexual activity: Not Currently         Review of Systems   Constitutional:  Positive for activity change and fatigue. Negative for fever.   Respiratory:  Positive for shortness of breath (with exertion). Negative for cough and chest tightness.    Cardiovascular:  Positive for leg swelling (L and now resolved). Negative for chest pain.   All other systems reviewed and are negative.    Objective:     Vital Signs (Most Recent):  Temp: 98.4 °F (36.9 °C) (10/02/24 1600)  Pulse: 89 (10/02/24 1615)  Resp: 20 (10/02/24 1615)  BP: (!) 163/96 (10/02/24 1615)  SpO2: 99 % (10/02/24 1615) Vital Signs (24h Range):  Temp:  [98.2 °F (36.8 °C)-98.4 °F (36.9 °C)] 98.4 °F (36.9 °C)  Pulse:  [80-93] 89  Resp:  [17-21] 20  SpO2:  [97 %-100 %] 99 %  BP: (130-167)/(77-99) 163/96     Weight: 72.4 kg (159 lb 9.8 oz)  Body mass index is 25 kg/m².      Intake/Output Summary (Last 24 hours) at 10/2/2024 1658  Last data filed at 10/2/2024 1624  Gross per 24 hour   Intake 74.88 ml   Output --   Net 74.88 ml        Physical Exam  Vitals reviewed.   Constitutional:       General: He is awake. He is not in acute distress.     Appearance: He is well-developed. He is not ill-appearing.      Comments: On RA   Cardiovascular:      Rate and Rhythm: Normal rate.      Pulses:           Radial pulses are 2+ on the right side and 2+ on the left side.   Pulmonary:      Effort: Pulmonary effort is normal.      Breath sounds: Normal breath sounds. No wheezing, rhonchi or rales.   Abdominal:      General: There is no distension.      Palpations: Abdomen is soft.   Musculoskeletal:      Right lower leg: No edema.      Left lower leg: No edema.   Skin:     General: Skin is warm and dry.   Neurological:      General: No focal deficit present.      Mental Status: He is alert.   Psychiatric:         Behavior: Behavior is cooperative.                Lines/Drains/Airways       Peripheral Intravenous Line  Duration                  Peripheral IV - Single Lumen 10/02/24 1240 20 G Anterior;Right Forearm <1 day                    Significant Labs:    CBC/Anemia Profile:  Recent Labs   Lab 10/02/24  1041   WBC 7.48   HGB 13.2*   HCT 40.7      MCV 89   RDW 12.8        Chemistries:  Recent Labs   Lab 10/02/24  1041      K 4.3      CO2 22*   BUN 11   CREATININE 1.1   CALCIUM 8.9   ALBUMIN 3.8   PROT 7.6   BILITOT 0.5   ALKPHOS 143*   *   *       All pertinent labs within the past 24 hours have been reviewed.    Significant Imaging:   I have reviewed all pertinent imaging results/findings within the past 24 hours.  Assessment/Plan:     Cardiac/Vascular  Hypertension associated with diabetes  - resume home meds as appropriate  - monitor hemodynamic trends and treat as needed       Hematology  * Acute pulmonary embolism  - CTA reviewed  - BNP and trop bumped, trend trops  - echo with right heart strain and preserved function  - heparin gtt  - DVT BLE pending, follow results  - has been eval per IR, no plans for intervention currently, monitors for changes    Endocrine  Diabetes mellitus without complication  - SSI   - monitor glycemic trends and adjust insulin as needed for adequate control     Prophylaxis Measures:  GI ppx: Oral Diet  VTE ppx: Heparin  Glucose control: Monitor blood glucose    Code Status: Full Code     Rachid Elmore NP  Critical Care Medicine  [facility] - Intensive Care ([facility])

## 2024-10-02 NOTE — PLAN OF CARE
[facility] - Emergency Dept.  Initial Discharge Assessment       Primary Care Provider: Claritza De La Garza MD-  ADMINISTRATION    Admission Diagnosis: No admission diagnoses are documented for this encounter.    Admission Date: 10/2/2024  Expected Discharge Date:     Transition of Care Barriers: (P) None    Payor: [employer] / Plan: [facility] [employer] [employer] / Product Type: Government /     Extended Emergency Contact Information  Primary Emergency Contact: Rufus,April  Address: [address] [facility]           [place] [address] [facility]  Home Phone: [phone]  Relation: Daughter  Secondary Emergency Contact: Trini Hooper  Mobile Phone: [phone]  Relation: Other    Discharge Plan A: (P) Home  Discharge Plan B: (P) Home      [facility], [place], [place] - [address]  [address]  [facility] [address]  Phone: [phone] Fax: [phone]    [facility] [id number] [facility] [place] [place] [facility] [address] [facility] [address] [facility] [address] [facility]  [address] [facility] [address]  Phone: [phone] Fax: [phone]      Initial Assessment (most recent)       Adult Discharge Assessment - 10/02/24 1123          Discharge Assessment    Assessment Type Discharge Planning Assessment     Confirmed/corrected address, phone number and insurance Yes     Confirmed Demographics Correct on Facesheet     Source of Information patient     When was your last doctors appointment? 02/14/24 (P)      Does patient/caregiver understand observation status Yes (P)      Communicated LALA with patient/caregiver No (P)      Reason For Admission Shortness of Breath (P)      People in Home child(usman), adult (P)      Facility Arrived From: Home (P)      Do you expect to return to your current living situation? Yes (P)      Do you have help at home or someone to help you manage your care at home? Yes (P)      Who are your caregiver(s) and their phone number(s)? April Rufus- daughter  [phone] (P)      Prior to hospitilization cognitive status: Alert/Oriented (P)      Current cognitive status: Alert/Oriented (P)      Walking or Climbing Stairs Difficulty no (P)      Dressing/Bathing Difficulty no (P)      Home Accessibility not wheelchair accessible (P)      Home Layout Able to live on 1st floor (P)      Equipment Currently Used at Home none (P)      Readmission within 30 days? No (P)      Patient currently being followed by outpatient case management? No (P)      Do you currently have service(s) that help you manage your care at home? No (P)      Do you take prescription medications? Yes (P)      Do you have prescription coverage? Yes (P)      Coverage [facility] [employer] (P)      Do you have any problems affording any of your prescribed medications? No (P)      Is the patient taking medications as prescribed? yes (P)      Who is going to help you get home at discharge? April Rufus- daughter [phone] (P)      How do you get to doctors appointments? family or friend will provide;car, drives self (P)      Are you on dialysis? No (P)      Do you take coumadin? No (P)      Discharge Plan A Home (P)      Discharge Plan B Home (P)      DME Needed Upon Discharge  none (P)      Discharge Plan discussed with: Patient;Adult children (P)      Transition of Care Barriers None (P)         Physical Activity    On average, how many days per week do you engage in moderate to strenuous exercise (like a brisk walk)? 0 days (P)      On average, how many minutes do you engage in exercise at this level? 0 min (P)         Financial Resource Strain    How hard is it for you to pay for the very basics like food, housing, medical care, and heating? Not hard at all (P)         Housing Stability    In the last 12 months, was there a time when you were not able to pay the mortgage or rent on time? No (P)      At any time in the past 12 months, were you homeless or living in a shelter (including  now)? No (P)         Transportation Needs    Has the lack of transportation kept you from medical appointments, meetings, work or from getting things needed for daily living? No (P)         Food Insecurity    Within the past 12 months, you worried that your food would run out before you got the money to buy more. Never true (P)      Within the past 12 months, the food you bought just didn't last and you didn't have money to get more. Never true (P)         Stress    Do you feel stress - tense, restless, nervous, or anxious, or unable to sleep at night because your mind is troubled all the time - these days? Not at all (P)         Social Isolation    How often do you feel lonely or isolated from those around you?  Never (P)         Alcohol Use    Q1: How often do you have a drink containing alcohol? Monthly or less (P)      Q2: How many drinks containing alcohol do you have on a typical day when you are drinking? 1 or 2 (P)      Q3: How often do you have six or more drinks on one occasion? Less than monthly (P)         [employer] Lab    In the past 12 months has the electric, gas, oil, or water company threatened to shut off services in your home? No (P)         Health Literacy    How often do you need to have someone help you when you read instructions, pamphlets, or other written material from your doctor or pharmacy? Never (P)         OTHER    Name(s) of People in Home April Hooper- daughter [phone] (P)                    Sw met with patient and adult daughter at bedside to complete initial assessment. Patient states he does not use any equipment.  Patient is not on any dialysis. Patient does not receive any services through the Coumadin clinic. Patient reports to have HH services but was not sure of the name. Patient reports being current with his PCP, Claritza De La Garza MD at the [facility]. Patient reports his daughters gets him to and from his medical appointments. When asked about the [facility] patient denies having  any current barriers. No other needs at this time.

## 2024-10-02 NOTE — PHARMACY MED REC
"Admission Medication History     The home medication history was taken by Lola Fierro.    You may go to "Admission" then "Reconcile Home Medications" tabs to review and/or act upon these items.     The home medication list has been updated by the Pharmacy department.   Please read ALL comments highlighted in yellow.   Please address this information as you see fit.    Feel free to contact us if you have any questions or require assistance.          Medications listed below were obtained from: Patient/family and Analytic software- GloriaMirlande (daughter at bedside)      LAST MED REC COMPLETED:     Lola Fierro  [id number]    Current Outpatient Medications on File Prior to Encounter   Medication Sig Dispense Refill Last Dose/Taking    mirtazapine (REMERON) 15 MG tablet Take 7.5 mg by mouth every evening.   10/1/2024 Bedtime    amLODIPine (NORVASC) 10 MG tablet Take 10 mg by mouth once daily.   10/2/2024 Morning    ammonium lactate (LAC-HYDRIN) 12 % lotion Apply topically 2 (two) times daily.   10/1/2024    cholecalciferol, vitamin D3, (VITAMIN D3) 50 mcg (2,000 unit) Tab Take 50 mcg by mouth once daily.   10/1/2024    diclofenac sodium (VOLTAREN) 1 % Gel Apply 4 g topically 4 (four) times daily.   Unknown    dorzolamide-timolol 2-0.5% (COSOPT) 22.3-6.8 mg/mL ophthalmic solution Place 1 drop into both eyes 2 (two) times daily.   10/1/2024    emollient combination no.117 (EUCERIN ADVANCED REPAIR HAND) Crea Apply topically once daily.   10/1/2024    LIDOcaine (LIDODERM) 5 % Place 1 patch onto the skin daily as needed.   Unknown    losartan (COZAAR) 100 MG tablet Take 50 mg by mouth once daily.   10/2/2024 Morning    meloxicam (MOBIC) 7.5 MG tablet Take 7.5 mg by mouth once daily.   Past Month    metFORMIN (GLUCOPHAGE-XR) 500 MG ER 24hr tablet Take 500 mg by mouth once daily.   10/2/2024 Morning    pramoxine 1 % Lotn Apply topically daily as needed.   Unknown    triamcinolone acetonide 0.1% (KENALOG) 0.1 " % cream Apply topically 2 (two) times daily.   Past Week                           .

## 2024-10-02 NOTE — HPI
73 y.o. male, PMHx: DM, HTN, glaucoma, and colonic polyps. Presented to the ED for evaluation of SOB which onset gradually 1 week PTA. Associated sxs include chest pain. Patient denies any n/v or fever. No prior treatment specified. In the ED,  vitals: 130/82, 93, 18, 98.2F, 98% RA on arrival. Significant Labs: AST: 238, ALT: 250. BNP: 162, Trop: 0.209. CTA Chest: extensive pulmonary emboli. Possible Right ventricle strain. EKG: neg for STEMI. Initiated on anticoagulation in the ED. Patient is a full code. Admitted to Hospital Medicine for management of Pulmonary Emboli.

## 2024-10-03 VITALS
SYSTOLIC BLOOD PRESSURE: 126 MMHG | HEIGHT: 67 IN | RESPIRATION RATE: 18 BRPM | BODY MASS INDEX: 26.51 KG/M2 | DIASTOLIC BLOOD PRESSURE: 73 MMHG | WEIGHT: 168.88 LBS | TEMPERATURE: 98 F | OXYGEN SATURATION: 96 % | HEART RATE: 77 BPM

## 2024-10-03 PROBLEM — I26.99 ACUTE PULMONARY EMBOLISM: Status: RESOLVED | Noted: 2024-10-02 | Resolved: 2024-10-03

## 2024-10-03 LAB
ALBUMIN SERPL BCP-MCNC: 3.6 G/DL (ref 3.5–5.2)
ALP SERPL-CCNC: 133 U/L (ref 55–135)
ALT SERPL W/O P-5'-P-CCNC: 164 U/L (ref 10–44)
ANION GAP SERPL CALC-SCNC: 13 MMOL/L (ref 8–16)
APTT PPP: 30.5 SEC (ref 21–32)
APTT PPP: 71.2 SEC (ref 21–32)
AST SERPL-CCNC: 81 U/L (ref 10–40)
BASOPHILS # BLD AUTO: 0.03 K/UL (ref 0–0.2)
BASOPHILS # BLD AUTO: 0.03 K/UL (ref 0–0.2)
BASOPHILS NFR BLD: 0.3 % (ref 0–1.9)
BASOPHILS NFR BLD: 0.3 % (ref 0–1.9)
BILIRUB SERPL-MCNC: 0.5 MG/DL (ref 0.1–1)
BUN SERPL-MCNC: 13 MG/DL (ref 8–23)
CALCIUM SERPL-MCNC: 8.9 MG/DL (ref 8.7–10.5)
CHLORIDE SERPL-SCNC: 106 MMOL/L (ref 95–110)
CO2 SERPL-SCNC: 18 MMOL/L (ref 23–29)
CREAT SERPL-MCNC: 1 MG/DL (ref 0.5–1.4)
DIFFERENTIAL METHOD BLD: ABNORMAL
DIFFERENTIAL METHOD BLD: ABNORMAL
EOSINOPHIL # BLD AUTO: 0.1 K/UL (ref 0–0.5)
EOSINOPHIL # BLD AUTO: 0.1 K/UL (ref 0–0.5)
EOSINOPHIL NFR BLD: 1.1 % (ref 0–8)
EOSINOPHIL NFR BLD: 1.1 % (ref 0–8)
ERYTHROCYTE [DISTWIDTH] IN BLOOD BY AUTOMATED COUNT: 12.9 % (ref 11.5–14.5)
ERYTHROCYTE [DISTWIDTH] IN BLOOD BY AUTOMATED COUNT: 12.9 % (ref 11.5–14.5)
EST. GFR  (NO RACE VARIABLE): >60 ML/MIN/1.73 M^2
GLUCOSE SERPL-MCNC: 112 MG/DL (ref 70–110)
HCT VFR BLD AUTO: 41 % (ref 40–54)
HCT VFR BLD AUTO: 41 % (ref 40–54)
HGB BLD-MCNC: 13.2 G/DL (ref 14–18)
HGB BLD-MCNC: 13.2 G/DL (ref 14–18)
IMM GRANULOCYTES # BLD AUTO: 0.03 K/UL (ref 0–0.04)
IMM GRANULOCYTES # BLD AUTO: 0.03 K/UL (ref 0–0.04)
IMM GRANULOCYTES NFR BLD AUTO: 0.3 % (ref 0–0.5)
IMM GRANULOCYTES NFR BLD AUTO: 0.3 % (ref 0–0.5)
LYMPHOCYTES # BLD AUTO: 2.8 K/UL (ref 1–4.8)
LYMPHOCYTES # BLD AUTO: 2.8 K/UL (ref 1–4.8)
LYMPHOCYTES NFR BLD: 32.3 % (ref 18–48)
LYMPHOCYTES NFR BLD: 32.3 % (ref 18–48)
MAGNESIUM SERPL-MCNC: 2.1 MG/DL (ref 1.6–2.6)
MCH RBC QN AUTO: 29 PG (ref 27–31)
MCH RBC QN AUTO: 29 PG (ref 27–31)
MCHC RBC AUTO-ENTMCNC: 32.2 G/DL (ref 32–36)
MCHC RBC AUTO-ENTMCNC: 32.2 G/DL (ref 32–36)
MCV RBC AUTO: 90 FL (ref 82–98)
MCV RBC AUTO: 90 FL (ref 82–98)
MONOCYTES # BLD AUTO: 0.7 K/UL (ref 0.3–1)
MONOCYTES # BLD AUTO: 0.7 K/UL (ref 0.3–1)
MONOCYTES NFR BLD: 7.4 % (ref 4–15)
MONOCYTES NFR BLD: 7.4 % (ref 4–15)
NEUTROPHILS # BLD AUTO: 5.1 K/UL (ref 1.8–7.7)
NEUTROPHILS # BLD AUTO: 5.1 K/UL (ref 1.8–7.7)
NEUTROPHILS NFR BLD: 58.6 % (ref 38–73)
NEUTROPHILS NFR BLD: 58.6 % (ref 38–73)
NRBC BLD-RTO: 0 /100 WBC
NRBC BLD-RTO: 0 /100 WBC
PHOSPHATE SERPL-MCNC: 3.9 MG/DL (ref 2.7–4.5)
PLATELET # BLD AUTO: 245 K/UL (ref 150–450)
PLATELET # BLD AUTO: 245 K/UL (ref 150–450)
PMV BLD AUTO: 9.2 FL (ref 9.2–12.9)
PMV BLD AUTO: 9.2 FL (ref 9.2–12.9)
POCT GLUCOSE: 118 MG/DL (ref 70–110)
POTASSIUM SERPL-SCNC: 3.5 MMOL/L (ref 3.5–5.1)
PROT SERPL-MCNC: 7.3 G/DL (ref 6–8.4)
RBC # BLD AUTO: 4.55 M/UL (ref 4.6–6.2)
RBC # BLD AUTO: 4.55 M/UL (ref 4.6–6.2)
SODIUM SERPL-SCNC: 137 MMOL/L (ref 136–145)
WBC # BLD AUTO: 8.77 K/UL (ref 3.9–12.7)
WBC # BLD AUTO: 8.77 K/UL (ref 3.9–12.7)

## 2024-10-03 PROCEDURE — 84100 ASSAY OF PHOSPHORUS: CPT | Performed by: NURSE PRACTITIONER

## 2024-10-03 PROCEDURE — 85730 THROMBOPLASTIN TIME PARTIAL: CPT | Performed by: SPECIALIST

## 2024-10-03 PROCEDURE — 63600175 PHARM REV CODE 636 W HCPCS: Performed by: SPECIALIST

## 2024-10-03 PROCEDURE — 90471 IMMUNIZATION ADMIN: CPT | Performed by: SPECIALIST

## 2024-10-03 PROCEDURE — 36415 COLL VENOUS BLD VENIPUNCTURE: CPT | Performed by: SPECIALIST

## 2024-10-03 PROCEDURE — 90653 IIV ADJUVANT VACCINE IM: CPT | Performed by: SPECIALIST

## 2024-10-03 PROCEDURE — 25000003 PHARM REV CODE 250: Performed by: EMERGENCY MEDICINE

## 2024-10-03 PROCEDURE — 3E02340 INTRODUCTION OF INFLUENZA VACCINE INTO MUSCLE, PERCUTANEOUS APPROACH: ICD-10-PCS | Performed by: EMERGENCY MEDICINE

## 2024-10-03 PROCEDURE — 25000003 PHARM REV CODE 250: Performed by: NURSE PRACTITIONER

## 2024-10-03 PROCEDURE — 83735 ASSAY OF MAGNESIUM: CPT | Performed by: NURSE PRACTITIONER

## 2024-10-03 PROCEDURE — 85025 COMPLETE CBC W/AUTO DIFF WBC: CPT | Performed by: EMERGENCY MEDICINE

## 2024-10-03 PROCEDURE — 63600175 PHARM REV CODE 636 W HCPCS: Performed by: EMERGENCY MEDICINE

## 2024-10-03 PROCEDURE — 36415 COLL VENOUS BLD VENIPUNCTURE: CPT | Performed by: NURSE PRACTITIONER

## 2024-10-03 PROCEDURE — 80053 COMPREHEN METABOLIC PANEL: CPT | Performed by: NURSE PRACTITIONER

## 2024-10-03 PROCEDURE — 25000003 PHARM REV CODE 250: Performed by: SPECIALIST

## 2024-10-03 RX ORDER — APIXABAN 5 MG (74)
KIT ORAL
Qty: 74 TABLET | Refills: 0 | Status: SHIPPED | OUTPATIENT
Start: 2024-10-03

## 2024-10-03 RX ADMIN — POTASSIUM BICARBONATE 40 MEQ: 391 TABLET, EFFERVESCENT ORAL at 02:10

## 2024-10-03 RX ADMIN — AMLODIPINE BESYLATE 10 MG: 10 TABLET ORAL at 08:10

## 2024-10-03 RX ADMIN — APIXABAN 10 MG: 2.5 TABLET, FILM COATED ORAL at 09:10

## 2024-10-03 RX ADMIN — HEPARIN SODIUM 12 UNITS/KG/HR: 10000 INJECTION, SOLUTION INTRAVENOUS at 08:10

## 2024-10-03 RX ADMIN — APIXABAN 10 MG: 2.5 TABLET, FILM COATED ORAL at 05:10

## 2024-10-03 RX ADMIN — LOSARTAN POTASSIUM 50 MG: 50 TABLET, FILM COATED ORAL at 08:10

## 2024-10-03 RX ADMIN — INFLUENZA A VIRUS A/VICTORIA/4897/2022 IVR-238 (H1N1) ANTIGEN (FORMALDEHYDE INACTIVATED), INFLUENZA A VIRUS A/THAILAND/8/2022 IVR-237 (H3N2) ANTIGEN (FORMALDEHYDE INACTIVATED), INFLUENZA B VIRUS B/AUSTRIA/1359417/2021 BVR-26 ANTIGEN (FORMALDEHYDE INACTIVATED) 0.5 ML: 15; 15; 15 INJECTION, SUSPENSION INTRAMUSCULAR at 05:10

## 2024-10-03 RX ADMIN — CHLORHEXIDINE GLUCONATE 0.12% ORAL RINSE 15 ML: 1.2 LIQUID ORAL at 08:10

## 2024-10-03 RX ADMIN — MUPIROCIN: 20 OINTMENT TOPICAL at 08:10

## 2024-10-03 RX ADMIN — DORZOLAMIDE HYDROCHLORIDE AND TIMOLOL MALEATE 1 DROP: 22.3; 6.8 SOLUTION/ DROPS OPHTHALMIC at 08:10

## 2024-10-03 NOTE — PROGRESS NOTES
[facility] [place] - Intensive Care [facility])  Critical Care Medicine  Progress Note    Patient Name: Cb Hooper  MRN: [id number]  Admission Date: 10/2/2024  Hospital Length of Stay: 1 days  Code Status: Full Code  Attending Provider: Ammon Mariee MD  Primary Care Provider: Catrina, Primary Doctor   Principal Problem: Acute pulmonary embolism    Subjective:     HPI:  73-year-old male with a known past medical history of diabetes, hypertension, and colon polyps that presented to the ER 10/2 for complaints of shortness of breath and left-sided chest pain has been going on for about 1 week. He also reports swelling of his left leg that has now resolved. Workup in the ER revealed elevated BNP, elevated troponin, and slightly elevated LFTs.  Patient underwent CTA of the chest which revealed extensive bilateral pulmonary emboli involving the main pulmonary arteries extending to all lobar branches and into segmental branches.  Patient initiated on heparin infusion.  Stat echo performed shows right heart strain. Vital signs stable and patient on room air.  No planned interventions per IR at this time.  Patient being admitted to the ICU for close monitoring.    At the time of my exam in the ICU, pt is awake and alert on RA in NAD watching TV and visiting with family. He is able to provide history.     Hospital/ICU Course:  10/3 Doing well. On RA. No SOB        Objective:     Vital Signs (Most Recent):  Temp: 97.6 °F (36.4 °C) (10/03/24 0705)  Pulse: 63 (10/03/24 1100)  Resp: 19 (10/03/24 1100)  BP: 113/67 (10/03/24 1100)  SpO2: 100 % (10/03/24 1100) Vital Signs (24h Range):  Temp:  [97.6 °F (36.4 °C)-98.4 °F (36.9 °C)] 97.6 °F (36.4 °C)  Pulse:  [55-99] 63  Resp:  [16-38] 19  SpO2:  [96 %-100 %] 100 %  BP: ()/(55-99) 113/67     Weight: 76.6 kg (168 lb 14 oz)  Body mass index is 26.45 kg/m².      Intake/Output Summary (Last 24 hours) at 10/3/2024 1118  Last data filed at 10/3/2024 0807  Gross per 24 hour   Intake 306.57 ml  "  Output 750 ml   Net -443.43 ml        Physical Exam  Vitals and nursing note reviewed.   Constitutional:       General: He is not in acute distress.     Appearance: He is not ill-appearing or toxic-appearing.   HENT:      Head: Normocephalic.   Eyes:      Pupils: Pupils are equal, round, and reactive to light.   Cardiovascular:      Rate and Rhythm: Tachycardia present.      Pulses: Normal pulses.   Pulmonary:      Breath sounds: Rhonchi present.   Abdominal:      Palpations: Abdomen is soft.   Skin:     Capillary Refill: Capillary refill takes less than 2 seconds.   Neurological:      General: No focal deficit present.      Mental Status: He is alert.           Review of Systems   Unable to perform ROS: Acuity of condition       Vents:       Lines/Drains/Airways       Peripheral Intravenous Line  Duration                  Peripheral IV - Single Lumen 10/02/24 1240 20 G Anterior;Right Forearm <1 day         Peripheral IV - Single Lumen 10/02/24 1838 20 G Left Forearm <1 day                    Significant Labs:    CBC/Anemia Profile:  Recent Labs   Lab 10/02/24  1041 10/03/24  0336   WBC 7.48 8.77  8.77   HGB 13.2* 13.2*  13.2*   HCT 40.7 41.0  41.0    245  245   MCV 89 90  90   RDW 12.8 12.9  12.9        Chemistries:  Recent Labs   Lab 10/02/24  1041 10/03/24  0336    137   K 4.3 3.5    106   CO2 22* 18*   BUN 11 13   CREATININE 1.1 1.0   CALCIUM 8.9 8.9   ALBUMIN 3.8 3.6   PROT 7.6 7.3   BILITOT 0.5 0.5   ALKPHOS 143* 133   * 164*   * 81*   MG  --  2.1   PHOS  --  3.9       All pertinent labs within the past 24 hours have been reviewed.    Significant Imaging:  I have reviewed all pertinent imaging results/findings within the past 24 hours.    ABG  No results for input(s): "PH", "PO2", "PCO2", "HCO3", "BE" in the last 168 hours.  Assessment/Plan:     Cardiac/Vascular  Hypertension associated with diabetes  - resume home meds as appropriate  - monitor hemodynamic trends and " treat as needed       Hematology  * Acute pulmonary embolism  - CTA reviewed  - BNP and trop bumped, trend trops  - echo with right heart strain and preserved function  - heparin gtt  - DVT BLE pending, follow results  - has been eval per IR, no plans for intervention currently, monitors for changes    10/3 No intervention planned  Start eliquis  Stable for floor  Will get hospitalist consult and transfer    Endocrine  Diabetes mellitus without complication  - SSI   - monitor glycemic trends and adjust insulin as needed for adequate control          Ammon Mariee MD  Critical Care Medicine  [facility] - Intensive Care ([facility]

## 2024-10-03 NOTE — SUBJECTIVE & OBJECTIVE
Objective:     Vital Signs (Most Recent):  Temp: 97.6 °F (36.4 °C) (10/03/24 0705)  Pulse: 63 (10/03/24 1100)  Resp: 19 (10/03/24 1100)  BP: 113/67 (10/03/24 1100)  SpO2: 100 % (10/03/24 1100) Vital Signs (24h Range):  Temp:  [97.6 °F (36.4 °C)-98.4 °F (36.9 °C)] 97.6 °F (36.4 °C)  Pulse:  [55-99] 63  Resp:  [16-38] 19  SpO2:  [96 %-100 %] 100 %  BP: ()/(55-99) 113/67     Weight: 76.6 kg (168 lb 14 oz)  Body mass index is 26.45 kg/m².      Intake/Output Summary (Last 24 hours) at 10/3/2024 1118  Last data filed at 10/3/2024 0807  Gross per 24 hour   Intake 306.57 ml   Output 750 ml   Net -443.43 ml        Physical Exam  Vitals and nursing note reviewed.   Constitutional:       General: He is not in acute distress.     Appearance: He is not ill-appearing or toxic-appearing.   HENT:      Head: Normocephalic.   Eyes:      Pupils: Pupils are equal, round, and reactive to light.   Cardiovascular:      Rate and Rhythm: Tachycardia present.      Pulses: Normal pulses.   Pulmonary:      Breath sounds: Rhonchi present.   Abdominal:      Palpations: Abdomen is soft.   Skin:     Capillary Refill: Capillary refill takes less than 2 seconds.   Neurological:      General: No focal deficit present.      Mental Status: He is alert.           Review of Systems   Unable to perform ROS: Acuity of condition       Vents:       Lines/Drains/Airways       Peripheral Intravenous Line  Duration                  Peripheral IV - Single Lumen 10/02/24 1240 20 G Anterior;Right Forearm <1 day         Peripheral IV - Single Lumen 10/02/24 1838 20 G Left Forearm <1 day                    Significant Labs:    CBC/Anemia Profile:  Recent Labs   Lab 10/02/24  1041 10/03/24  0336   WBC 7.48 8.77  8.77   HGB 13.2* 13.2*  13.2*   HCT 40.7 41.0  41.0    245  245   MCV 89 90  90   RDW 12.8 12.9  12.9        Chemistries:  Recent Labs   Lab 10/02/24  1041 10/03/24  0336    137   K 4.3 3.5    106   CO2 22* 18*   BUN 11 13    CREATININE 1.1 1.0   CALCIUM 8.9 8.9   ALBUMIN 3.8 3.6   PROT 7.6 7.3   BILITOT 0.5 0.5   ALKPHOS 143* 133   * 164*   * 81*   MG  --  2.1   PHOS  --  3.9       All pertinent labs within the past 24 hours have been reviewed.    Significant Imaging:  I have reviewed all pertinent imaging results/findings within the past 24 hours.

## 2024-10-03 NOTE — HOSPITAL COURSE
73-year-old male with hx of diabetes, hypertension, and colon polyps who presented to the ER 10/2 for complaints of shortness of breath and left-sided chest pain has been going on for about 1 week. He also reports swelling of his left leg that has now resolved. Workup in the ER revealed elevated BNP, elevated troponin, and slightly elevated LFTs.  Patient underwent CTA of the chest which revealed extensive bilateral pulmonary emboli involving the main pulmonary arteries extending to all lobar branches and into segmental branches.  Patient initiated on heparin infusion.  Stat echo performed shows right heart strain. Vital signs stable and patient on room air.  No planned interventions per IR at this time.  Patient being admitted to the ICU for close monitoring.     At the time of my exam in the ICU, pt is awake and alert on RA in [facility] watching TV and visiting with family. He is able to provide history.      Hospital/ICU Course:  10/3 Doing well. On RA. No SOB. Switched to oral Eliquis 1o mg po this am and downgraded to Hosp Med this am.    Pt seen and examined, chart reviewed, Elderly man in NAD, AAOx4, VSS afeb, Sats 98% on RA, eating drinking well, walking around well, appears totally asymptomatic. No CP or SOB. L sided CP resolved. Echo showed normal LVF, no Right heart strain. Venous Doppler show Left peroneal vein thrombus, otherwise clear of DVT bilateral lower extremity. Evaluated by Dr. Carline FISHER for the PE- patient hemodynamically stable and in no acute distress. Recommend heparin therapy and monitor efficacy. No intervention indicated at this time. Pt is eating drinking well, walking around well in the room. Daughter at bedside. 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 He was seen and examined and deemed stable  for discharge home today. [facility] and also [facility] to Pulm for f/u.

## 2024-10-03 NOTE — DISCHARGE SUMMARY
[place] - Intensive Care ([facility])  [facility]  Discharge Summary      Patient Name: Cb Hooper  MRN: [id number]  BASSAM: [id number]  Patient Class: IP- Inpatient  Admission Date: 10/2/2024  Hospital Length of Stay: 1 days  Discharge Date and Time:  10/03/2024 6:03 PM  Attending Physician: Catrina att. providers found   Discharging Provider: Janet Nicole MD  Primary Care Provider: Catrina, Primary Doctor    Primary Care Team: [facility] MEDICINE D    HPI:   73 y.o. male, PMHx: DM, HTN, glaucoma, and colonic polyps. Presented to the ED for evaluation of SOB which onset gradually 1 week PTA. Associated sxs include chest pain. Patient denies any n/v or fever. No prior treatment specified. In the ED,  vitals: 130/82, 93, 18, 98.2F, 98% RA on arrival. Significant Labs: AST: 238, ALT: 250. BNP: 162, Trop: 0.209. CTA Chest: extensive pulmonary emboli. Possible Right ventricle strain. EKG: neg for STEMI. Initiated on anticoagulation in the ED. Patient is a full code. Admitted to [facility] for management of Pulmonary Emboli.     * No surgery found *      Hospital Course:   73-year-old male with hx of diabetes, hypertension, and colon polyps who presented to the ER 10/2 for complaints of shortness of breath and left-sided chest pain has been going on for about 1 week. He also reports swelling of his left leg that has now resolved. Workup in the ER revealed elevated BNP, elevated troponin, and slightly elevated LFTs.  Patient underwent CTA of the chest which revealed extensive bilateral pulmonary emboli involving the main pulmonary arteries extending to all lobar branches and into segmental branches.  Patient initiated on heparin infusion.  Stat echo performed shows right heart strain. Vital signs stable and patient on room air.  No planned interventions per IR at this time.  Patient being admitted to the ICU for close monitoring.     At the time of my exam in the ICU, pt is awake and alert on RA in NAD watching TV and  visiting with family. He is able to provide history.      Hospital/ICU Course:  10/3 Doing well. On RA. No SOB. Switched to oral Eliquis 1o mg po this am and downgraded to Hosp Med this am.    Pt seen and examined, chart reviewed, Elderly man in NAD, AAOx4, VSS afeb, Sats 98% on RA, eating drinking well, walking around well, appears totally asymptomatic. No CP or SOB. L sided CP resolved. Echo showed normal LVF, no Right heart strain. Venous Doppler show Left peroneal vein thrombus, otherwise clear of DVT bilateral lower extremity. Evaluated by Dr. Carline FISHER for the PE- patient hemodynamically stable and in no acute distress. Recommend heparin therapy and monitor efficacy. No intervention indicated at this time. LFTs also improving. Pt is eating drinking well, walking around well in the room. Daughter at bedside. 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 He was seen and examined and deemed stable  for discharge home today. [facility] also referred to Pulm for f/u.        Goals of Care Treatment Preferences:  Code Status: Full Code      SDOH Screening:  The patient was screened for utility difficulties, food insecurity, transport difficulties, housing insecurity, and interpersonal safety and there were no concerns identified this admission.     Consults:   Consults (From admission, onward)          Status Ordering Provider     Inpatient consult to Hospitalist  Once        Provider:  Janet Nicole MD    Acknowledged EJ BERNABE     Inpatient consult to Critical Care Medicine  Once        Provider:  Ammon Mariee MD    Acknowledged MELIZA BAILEY            Other  * Acute pulmonary embolism-resolved as of 10/3/2024  Suspected Right Heart strain per CTA, ECHO pending.         Final Active Diagnoses:    Diagnosis Date Noted POA    Hypertension associated with diabetes [E11.59, I15.2] 08/15/2023 Yes    Diabetes mellitus without  complication [E11.9] 08/15/2023 Yes      Problems Resolved During this Admission:    Diagnosis Date Noted Date Resolved POA    PRINCIPAL PROBLEM:  Acute pulmonary embolism [I26.99] 10/02/2024 10/03/2024 Yes       Discharged Condition: stable    Disposition: Home or Self Care    Follow Up:   Follow-up Information       Chauncey De Jesus MD. Schedule an appointment as soon as possible for a visit in 1 week(s).    Specialties: Pulmonary Disease, Critical Care Medicine  Why: Hospital follow up for PE  Contact information:  [id number]  [place] [address]  [phone]               Carolina Alvarado MD. Schedule an appointment as soon as possible for a visit in 1 week(s).    Specialty: Dermatology  Why: Hospital follow up for extensive Actinic keratosis,  Contact information:  [address]  [place] [address]  [phone]                           Patient Instructions:      Diet Cardiac     Diet diabetic     Activity as tolerated       Significant Diagnostic Studies: Labs: BMP:   Recent Labs   Lab 10/02/24  1041 10/03/24  0336   * 112*    137   K 4.3 3.5    106   CO2 22* 18*   BUN 11 13   CREATININE 1.1 1.0   CALCIUM 8.9 8.9   MG  --  2.1   , CMP   Recent Labs   Lab 10/02/24  1041 10/03/24  0336    137   K 4.3 3.5    106   CO2 22* 18*   * 112*   BUN 11 13   CREATININE 1.1 1.0   CALCIUM 8.9 8.9   PROT 7.6 7.3   ALBUMIN 3.8 3.6   BILITOT 0.5 0.5   ALKPHOS 143* 133   * 81*   * 164*   ANIONGAP 8 13   , CBC   Recent Labs   Lab 10/02/24  1041 10/03/24  0336   WBC 7.48 8.77  8.77   HGB 13.2* 13.2*  13.2*   HCT 40.7 41.0  41.0    245  245   , INR   Lab Results   Component Value Date    INR 1.0 10/02/2024    INR 1.0 10/02/2024   , Troponin   Recent Labs   Lab 10/02/24  1041 10/02/24  1607 10/02/24  2025   TROPONINI 0.209* 0.173* 0.152*   , A1C:   Recent Labs   Lab 10/02/24  1041   HGBA1C 5.8*   , and All labs within the past 24 hours have been  reviewed  Radiology:   Imaging Results              US Lower Extremity Veins Bilateral (Final result)  Result time 10/02/24 14:54:38      Final result by Stan Cat MD (10/02/24 14:54:38)                   Impression:      Left peroneal vein thrombus otherwise clear of DVT bilateral lower extremity      Electronically signed by: Stan Cat MD  Date:    10/02/2024  Time:    14:54               Narrative:    EXAMINATION:  US LOWER EXTREMITY VEINS BILATERAL    CLINICAL HISTORY:  Other pulmonary embolism without acute cor pulmonale    TECHNIQUE:  Duplex and color flow Doppler and dynamic compression was performed of the bilateral lower extremity veins was performed.    COMPARISON:  09/04/2014    FINDINGS:  Right thigh veins: The common femoral, femoral, popliteal, upper greater saphenous, and deep femoral veins are patent and free of thrombus. The veins are normally compressible and have normal phasic flow and augmentation response.    Right calf veins: The visualized calf veins are patent.    Left thigh veins: The common femoral, femoral, popliteal, upper greater saphenous, and deep femoral veins are patent and free of thrombus. The veins are normally compressible and have normal phasic flow and augmentation response.    Left calf veins: Left peroneal vein thrombus otherwise patent calf veins.    Miscellaneous: None                                        CTA Chest Non-Coronary (PE Studies) (Final result)  Result time 10/02/24 12:55:36      Final result by Fran CortesJc), MD (10/02/24 12:55:36)                   Impression:      Extensive bilateral pulmonary emboli involving the main pulmonary arteries extending to all lobar branches and into segmental branches.  Right ventricle strain is suspected.    This report was flagged in Epic as abnormal.    All CT scans at this facility use dose modulation, iterative reconstruction and/or weight based dosing when appropriate to reduce radiation dose to as  low as reasonably achievable.      Electronically signed by: Fran Cortes MD  Date:    10/02/2024  Time:    12:55               Narrative:    EXAMINATION:  CTA CHEST NON CORONARY (PE STUDIES)    CLINICAL HISTORY:  PE suspected, intermediate prob, neg D-dimer;    TECHNIQUE:  Standard CTA of the chest performed with 100 cc Omnipaque 350 utilizing 3-D MIP reformations.    COMPARISON:  None    FINDINGS:  Extensive bilateral pulmonary embolism involving the main pulmonary arteries bilaterally and extending into the lobar and segmental branches.  Right ventricle strain suspected.    There are coronary artery calcifications present.  No pericardial effusion.  Limited opacification of the thoracic aorta.  No evidence of aortic aneurysm.    No evidence of mediastinal or hilar adenopathy.    Lung fields are clear.    Upper abdominal images are negative.                                       X-Ray Chest AP Portable (Final result)  Result time 10/02/24 09:54:51      Final result by Hortencia Kern MD (10/02/24 09:54:51)                   Impression:      No acute abnormality.      Electronically signed by: Hortencia Kern  Date:    10/02/2024  Time:    09:54               Narrative:    EXAMINATION:  XR CHEST AP PORTABLE    CLINICAL HISTORY:  sob;    TECHNIQUE:  Single frontal view of the chest was performed.    COMPARISON:  None    FINDINGS:  The lungs are clear, with normal appearance of pulmonary vasculature and no pleural effusion or pneumothorax.    The cardiac silhouette is normal in size. The hilar and mediastinal contours are unremarkable.    Bones are intact.  There is degenerative change of the shoulders and spine.                                     Cardiac Graphics: Echocardiogram: Transthoracic echo (TTE) complete (Cupid Only):   Results for orders placed or performed during the hospital encounter of 10/02/24   Echo   Result Value Ref Range    BSA 1.92 m2    LVOT stroke volume 83.2 cm3    LVIDd 3.9 3.5 -  6.0 cm    LV Systolic Volume 17.77 mL    LV Systolic Volume Index 9.4 mL/m2    LVIDs 2.3 2.1 - 4.0 cm    LV ESV A2C 37.80 mL    LV Diastolic Volume 67.05 mL    LV ESV A4C 36.50 mL    LV Diastolic Volume Index 35.29 mL/m2    Left Ventricular End Systolic Volume by Teichholz Method 17.77 mL    Left Ventricular End Diastolic Volume by Teichholz Method 67.05 mL    IVS 1.3 (A) 0.6 - 1.1 cm    LVOT diameter 2.2 cm    LVOT area 3.8 cm2    FS 41.0 28 - 44 %    Left Ventricle Relative Wall Thickness 0.51 cm    PW 1.0 0.6 - 1.1 cm    LV mass 149.5 g    LV Mass Index 78.7 g/m2    MV Peak E Armando 0.50 m/s    TDI LATERAL 0.11 m/s    TDI SEPTAL 0.09 m/s    E/E' ratio 5.00 m/s    MV Peak A Armando 0.97 m/s    TR Max Armando 3.95 m/s    E/A ratio 0.52     IVRT 81.83 msec    E wave deceleration time 205.33 msec    LV SEPTAL E/E' RATIO 5.56 m/s    LV LATERAL E/E' RATIO 4.55 m/s    LVOT peak armando 1.2 m/s    Left Ventricular Outflow Tract Mean Velocity 0.76 cm/s    Left Ventricular Outflow Tract Mean Gradient 2.54 mmHg    RVOT peak VTI 13.6 cm    TAPSE 1.59 cm    LA size 3.25 cm    Left Atrium Minor Axis 5.02 cm    Left Atrium Major Axis 5.38 cm    LA Vol (MOD) 38.25 mL    KHARI (MOD) 20.1 mL/m2    RA Major Axis 5.05 cm    AV mean gradient 4.9 mmHg    AV peak gradient 9.0 mmHg    Ao peak armando 1.5 m/s    Ao VTI 26.7 cm    LVOT peak VTI 21.9 cm    AV valve area 3.1 cm²    AV Velocity Ratio 0.80     AV index (prosthetic) 0.82     MAGED by Velocity Ratio 3.0 cm²    MV stenosis pressure 1/2 time 59.55 ms    MV valve area p 1/2 method 3.69 cm2    Triscuspid Valve Regurgitation Peak Gradient 62 mmHg    PV mean gradient 1 mmHg    PV PEAK VELOCITY 0.53 m/s    PV peak gradient 1 mmHg    RVOT peak armando 0.52 m/s    Ao root annulus 3.81 cm    STJ 3.25 cm    Ascending aorta 2.95 cm    IVC diameter 1.49 cm    Mean e' 0.10 m/s    ZLVIDS -2.82     ZLVIDD -3.13     LA area A4C 16.12 cm2    LA area A2C 15.49 cm2    RVDD 3.85 cm    KHARI 25.7 mL/m2    LA Vol 48.78 cm3     LA WIDTH 3.4 cm    RA Width 3.1 cm    TV resting pulmonary artery pressure 70 mmHg    RV TB RVSP 12 mmHg    Est. RA pres 8 mmHg    Narrative      Right ventricle wall motion has Najera's sign.    Left Ventricle: The left ventricle is normal in size. Normal wall   thickness. Normal wall motion. Septal flattening in systole consistent   with right ventricular pressure overload. There is normal systolic   function with a visually estimated ejection fraction of 55 - 60%. There is   normal diastolic function.    Right Ventricle: Right ventricular enlargement. Wall thickness is   normal. Right ventricle wall motion has Najera's sign. Systolic   function is mildly reduced.    Pulmonary Artery: The estimated pulmonary artery systolic pressure is   70 mmHg.    IVC/SVC: Intermediate venous pressure at 8 mmHg.         Pending Diagnostic Studies:       None           Medications:  Reconciled Home Medications:      Medication List        START taking these medications      * ELIQUIS DVT-PE TREAT 30D START 5 mg (74 tabs) Dspk  Generic drug: apixaban  For the first 7 days take two 5 mg tablets twice daily.  After 7 days take one 5 mg tablet twice daily.     * apixaban 5 mg Tab  Commonly known as: ELIQUIS  Take 1 tablet (5 mg total) by mouth 2 (two) times daily for 30 days, THEN 1 tablet (5 mg total) 2 (two) times daily.  Start taking on: October 3, 2024           * This list has 2 medication(s) that are the same as other medications prescribed for you. Read the directions carefully, and ask your doctor or other care provider to review them with you.                CONTINUE taking these medications      amLODIPine 10 MG tablet  Commonly known as: NORVASC  Take 10 mg by mouth once daily.     ammonium lactate 12 % lotion  Commonly known as: LAC-HYDRIN  Apply topically 2 (two) times daily.     cholecalciferol (vitamin D3) 50 mcg (2,000 unit) Tab  Commonly known as: VITAMIN D3  Take 50 mcg by mouth once daily.     diclofenac  sodium 1 % Gel  Commonly known as: VOLTAREN  Apply 4 g topically 4 (four) times daily.     dorzolamide-timolol 2-0.5% 22.3-6.8 mg/mL ophthalmic solution  Commonly known as: COSOPT  Place 1 drop into both eyes 2 (two) times daily.     EUCERIN ADVANCED REPAIR HAND Crea  Generic drug: emollient combination no.117  Apply topically once daily.     LIDOcaine 5 %  Commonly known as: LIDODERM  Place 1 patch onto the skin daily as needed.     losartan 100 MG tablet  Commonly known as: COZAAR  Take 50 mg by mouth once daily.     meloxicam 7.5 MG tablet  Commonly known as: MOBIC  Take 7.5 mg by mouth once daily.     metFORMIN 500 MG ER 24hr tablet  Commonly known as: GLUCOPHAGE-XR  Take 500 mg by mouth once daily.     mirtazapine 15 MG tablet  Commonly known as: REMERON  Take 7.5 mg by mouth every evening.     pramoxine 1 % Lotn  Apply topically daily as needed.     triamcinolone acetonide 0.1% 0.1 % cream  Commonly known as: KENALOG  Apply topically 2 (two) times daily.              Indwelling Lines/Drains at time of discharge:   Lines/Drains/Airways       None                   Time spent on the discharge of patient: 45 minutes      Janet Nicole MD  Department of Hospital Medicine  '[place] - Intensive Care ([facility])

## 2024-10-03 NOTE — PLAN OF CARE
Problem: Adult Inpatient Plan of Care  Goal: Plan of Care Review  Outcome: Progressing   Pt remains AAOx4, no complaints of pain or discomfort. Afebrile. NSR to sinus charlene on monitor. BP stable. PIV infusing Heparin gtt. Pt voids independently to urinal. Pt repositions independently in bed. POC reviewed with pt, verbalized understanding. Will continue with current POC and update as needed.

## 2024-10-03 NOTE — ASSESSMENT & PLAN NOTE
- CTA reviewed  - BNP and trop bumped, trend trops  - echo with right heart strain and preserved function  - heparin gtt  - DVT BLE pending, follow results  - has been eval per IR, no plans for intervention currently, monitors for changes    10/3 No intervention planned  Start eliquis  Stable for floor  Will get hospitalist consult and transfer

## 2024-10-24 ENCOUNTER — TELEPHONE (OUTPATIENT)
Dept: HEMATOLOGY/ONCOLOGY | Facility: CLINIC | Age: 74
End: 2024-10-24
Payer: OTHER GOVERNMENT

## 2024-10-24 NOTE — TELEPHONE ENCOUNTER
Spoke to pt in reference to Hem/Onc appt scheduled on 11/19 has been canceled and will need to be r/s d/t being scheduled incorrectly on Onc schedule. Appt r/s per request at the [facility]. Notice via pt portal.

## 2024-11-01 ENCOUNTER — OFFICE VISIT (OUTPATIENT)
Dept: CARDIOLOGY | Facility: CLINIC | Age: 74
End: 2024-11-01
Payer: OTHER GOVERNMENT

## 2024-11-01 VITALS
BODY MASS INDEX: 26.61 KG/M2 | DIASTOLIC BLOOD PRESSURE: 74 MMHG | HEIGHT: 67 IN | OXYGEN SATURATION: 100 % | SYSTOLIC BLOOD PRESSURE: 138 MMHG | WEIGHT: 169.56 LBS | HEART RATE: 63 BPM

## 2024-11-01 DIAGNOSIS — I15.2 HYPERTENSION ASSOCIATED WITH DIABETES: ICD-10-CM

## 2024-11-01 DIAGNOSIS — I27.20 PULMONARY HYPERTENSION: ICD-10-CM

## 2024-11-01 DIAGNOSIS — I26.99 ACUTE PULMONARY EMBOLISM, UNSPECIFIED PULMONARY EMBOLISM TYPE, UNSPECIFIED WHETHER ACUTE COR PULMONALE PRESENT: Primary | ICD-10-CM

## 2024-11-01 DIAGNOSIS — E55.9 VITAMIN D DEFICIENCY: ICD-10-CM

## 2024-11-01 DIAGNOSIS — R06.09 DOE (DYSPNEA ON EXERTION): ICD-10-CM

## 2024-11-01 DIAGNOSIS — E11.59 HYPERTENSION ASSOCIATED WITH DIABETES: ICD-10-CM

## 2024-11-01 DIAGNOSIS — R94.31 NONSPECIFIC ABNORMAL ELECTROCARDIOGRAM (ECG) (EKG): ICD-10-CM

## 2024-11-01 PROCEDURE — 99999 PR PBB SHADOW E&M-EST. PATIENT-LVL V: CPT | Mod: PBBFAC,,, | Performed by: INTERNAL MEDICINE

## 2024-11-01 PROCEDURE — 99215 OFFICE O/P EST HI 40 MIN: CPT | Mod: PBBFAC | Performed by: INTERNAL MEDICINE

## 2025-03-13 ENCOUNTER — TELEPHONE (OUTPATIENT)
Dept: HEMATOLOGY/ONCOLOGY | Facility: CLINIC | Age: 75
End: 2025-03-13
Payer: OTHER GOVERNMENT

## 2025-03-13 NOTE — TELEPHONE ENCOUNTER
----- Message from Fuentes sent at 3/13/2025 11:33 AM CDT -----  Contact: [facility]  Good morning Valente,Good morning Valente, when you get my message can you r/s Cb Hooper mrn [id number] for a pulmon embolism without acute cor pulmonale, he is requesting a Friday mid morning, please, thanks

## 2025-04-10 DIAGNOSIS — R06.09 DOE (DYSPNEA ON EXERTION): Primary | ICD-10-CM

## 2025-04-11 ENCOUNTER — OFFICE VISIT (OUTPATIENT)
Dept: CARDIOLOGY | Facility: CLINIC | Age: 75
End: 2025-04-11
Payer: OTHER GOVERNMENT

## 2025-04-11 ENCOUNTER — HOSPITAL ENCOUNTER (OUTPATIENT)
Dept: CARDIOLOGY | Facility: HOSPITAL | Age: 75
Discharge: HOME | End: 2025-04-11
Attending: INTERNAL MEDICINE
Payer: OTHER GOVERNMENT

## 2025-04-11 VITALS
DIASTOLIC BLOOD PRESSURE: 82 MMHG | HEIGHT: 67 IN | SYSTOLIC BLOOD PRESSURE: 140 MMHG | WEIGHT: 173.94 LBS | OXYGEN SATURATION: 99 % | HEART RATE: 63 BPM | BODY MASS INDEX: 27.3 KG/M2

## 2025-04-11 DIAGNOSIS — E11.59 HYPERTENSION ASSOCIATED WITH DIABETES: Primary | ICD-10-CM

## 2025-04-11 DIAGNOSIS — E55.9 VITAMIN D DEFICIENCY: ICD-10-CM

## 2025-04-11 DIAGNOSIS — I15.2 HYPERTENSION ASSOCIATED WITH DIABETES: Primary | ICD-10-CM

## 2025-04-11 DIAGNOSIS — E11.9 DIABETES MELLITUS WITHOUT COMPLICATION: ICD-10-CM

## 2025-04-11 DIAGNOSIS — R06.09 DOE (DYSPNEA ON EXERTION): ICD-10-CM

## 2025-04-11 DIAGNOSIS — I26.99 ACUTE PULMONARY EMBOLISM, UNSPECIFIED PULMONARY EMBOLISM TYPE, UNSPECIFIED WHETHER ACUTE COR PULMONALE PRESENT: ICD-10-CM

## 2025-04-11 DIAGNOSIS — I27.20 PULMONARY HYPERTENSION: ICD-10-CM

## 2025-04-11 DIAGNOSIS — Z86.711 HISTORY OF PULMONARY EMBOLISM: ICD-10-CM

## 2025-04-11 DIAGNOSIS — Z76.89 ENCOUNTER TO ESTABLISH CARE: ICD-10-CM

## 2025-04-11 PROBLEM — E66.01 SEVERE OBESITY (BMI 35.0-39.9) WITH COMORBIDITY: Status: RESOLVED | Noted: 2024-04-19 | Resolved: 2025-04-11

## 2025-04-11 PROCEDURE — 93010 ELECTROCARDIOGRAM REPORT: CPT | Mod: ,,, | Performed by: INTERNAL MEDICINE

## 2025-04-11 PROCEDURE — 99214 OFFICE O/P EST MOD 30 MIN: CPT | Mod: PBBFAC | Performed by: INTERNAL MEDICINE

## 2025-04-11 PROCEDURE — 93005 ELECTROCARDIOGRAM TRACING: CPT

## 2025-04-11 PROCEDURE — 99999 PR PBB SHADOW E&M-EST. PATIENT-LVL IV: CPT | Mod: PBBFAC,,, | Performed by: INTERNAL MEDICINE

## 2025-04-11 NOTE — PROGRESS NOTES
Subjective:   Patient ID:  Cb Hooper is a 74 y.o. male who presents for cardiac consult of No chief complaint on file.      Referral by: No referring provider defined for this encounter.     Reason for consult: abnormal ECG      HPI  The patient came in today for cardiac consult of No chief complaint on file.      Cb Hooper is a 74 y.o. male pt with PE 10/2024, pulm HTN,  HTN, HLD, DM2, Vit D def, Obesity presents for follow up CV eval.     4/18/24  Overall is active, BP 140s/70s. HR 60s.   BMI 35 - 181 lbs   HE has VICKERS at times with walking.     11/1/24 - hosp follow up    Hospital Course:   73-year-old male with hx of diabetes, hypertension, and colon polyps who presented to the ER 10/2 for complaints of shortness of breath and left-sided chest pain has been going on for about 1 week. He also reports swelling of his left leg that has now resolved. Workup in the ER revealed elevated BNP, elevated troponin, and slightly elevated LFTs.  Patient underwent CTA of the chest which revealed extensive bilateral pulmonary emboli involving the main pulmonary arteries extending to all lobar branches and into segmental branches.  Patient initiated on heparin infusion.  Stat echo performed shows right heart strain. Vital signs stable and patient on room air.  No planned interventions per IR at this time.  Patient being admitted to the ICU for close monitoring.  At the time of my exam in the ICU, pt is awake and alert on RA in [facility] watching TV and visiting with family. He is able to provide history.   Hospital/ICU Course:  10/3 Doing well. On RA. No SOB. Switched to oral Eliquis 1o mg po this am and downgraded to Hosp Med this am.     Pt seen and examined, chart reviewed, Elderly man in NAD, AAOx4, VSS afeb, Sats 98% on RA, eating drinking well, walking around well, appears totally asymptomatic. No CP or SOB. L sided CP resolved. Echo showed normal LVF, no Right heart strain. Venous Doppler show Left peroneal vein  thrombus, otherwise clear of DVT bilateral lower extremity. Evaluated by Dr. Carline FISHER for the PE- patient hemodynamically stable and in no acute distress. Recommend heparin therapy and monitor efficacy. No intervention indicated at this time. LFTs also improving. Pt is eating drinking well, walking around well in the room. Daughter at bedside. 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 He was seen and examined and deemed stable  for discharge home today. [facility] also referred to Pulm for f/u.     Pt had neg stress 7/2024  ECHO 10/2024 with normal LV function, mildly dec RV function, PASP 70 mmHg worse than July 2024.   BP and HR stable. BMI 26 - 169 lbs     4/11/25  BP mildly elevated 140/82. BMI 27 - 173 lbs   He is overall stable since PE.   He has not been to heme/onc  yet.     ECG  - NSR, LVH       No cardiac monitor results found for the past 12 months     Results for orders placed during the hospital encounter of 10/02/24    Echo    Interpretation Summary    Right ventricle wall motion has Najera's sign.    Left Ventricle: The left ventricle is normal in size. Normal wall thickness. Normal wall motion. Septal flattening in systole consistent with right ventricular pressure overload. There is normal systolic function with a visually estimated ejection fraction of 55 - 60%. There is normal diastolic function.    Right Ventricle: Right ventricular enlargement. Wall thickness is normal. Right ventricle wall motion has Najera's sign. Systolic function is mildly reduced.    Pulmonary Artery: The estimated pulmonary artery systolic pressure is 70 mmHg.    IVC/SVC: Intermediate venous pressure at 8 mmHg.      Results for orders placed during the hospital encounter of 07/26/24    Nuclear Stress - Cardiology Interpreted    Interpretation Summary    Normal myocardial perfusion scan. There is no evidence of myocardial ischemia or infarction.     The gated perfusion images showed an ejection fraction of 83% at rest. The gated perfusion images showed an ejection fraction of 72% post stress.    The ECG portion of the study is negative for ischemia.    The patient reported no chest pain during the stress test.    The exercise capacity was moderately impaired.    Pt experienced dyspnea disproportionate to exertion during stress test. O2 sat decreased to 88% RA- returned to WNL quickly during recovery. No chest pain pre, during, or post stress test.      Past Medical History:   Diagnosis Date    Diabetes mellitus without complication     Hypertension associated with diabetes     Personal history of colonic polyps        Past Surgical History:   Procedure Laterality Date    APPENDECTOMY      COLONOSCOPY N/A 7/7/2023    Procedure: COLONOSCOPY;  Surgeon: Kyle Henry MD;  Location: [facility];  Service: Endoscopy;  Laterality: N/A;    KNEE SURGERY Bilateral        Social History     Tobacco Use    Smoking status: Former     Types: Cigarettes    Smokeless tobacco: Never   Substance Use Topics    Alcohol use: Not Currently    Drug use: Never       Family History   Problem Relation Name Age of Onset    Diabetes Mother         Patient's Medications   New Prescriptions    No medications on file   Previous Medications    AMLODIPINE (NORVASC) 10 MG TABLET    Take 10 mg by mouth once daily.    AMMONIUM LACTATE (LAC-HYDRIN) 12 % LOTION    Apply topically 2 (two) times daily.    APIXABAN (ELIQUIS DVT-PE TREAT 30D START) 5 MG (74 TABS) DSPK    For the first 7 days take two 5 mg tablets twice daily.  After 7 days take one 5 mg tablet twice daily.    CHOLECALCIFEROL, VITAMIN D3, (VITAMIN D3) 50 MCG (2,000 UNIT) TAB    Take 50 mcg by mouth once daily.    DICLOFENAC SODIUM (VOLTAREN) 1 % GEL    Apply 4 g topically 4 (four) times daily.    DORZOLAMIDE-TIMOLOL 2-0.5% (COSOPT) 22.3-6.8 MG/ML OPHTHALMIC SOLUTION    Place 1 drop into both eyes 2 (two) times daily.    EMOLLIENT  "COMBINATION NO.117 (EUCERIN ADVANCED REPAIR HAND) CREA    Apply topically once daily.    LIDOCAINE (LIDODERM) 5 %    Place 1 patch onto the skin daily as needed.    LOSARTAN (COZAAR) 100 MG TABLET    Take 50 mg by mouth once daily.    MELOXICAM (MOBIC) 7.5 MG TABLET    Take 7.5 mg by mouth once daily.    METFORMIN (GLUCOPHAGE-XR) 500 MG ER 24HR TABLET    Take 500 mg by mouth once daily.    MIRTAZAPINE (REMERON) 15 MG TABLET    Take 7.5 mg by mouth every evening.    PRAMOXINE 1 % LOTN    Apply topically daily as needed.    TRIAMCINOLONE ACETONIDE 0.1% (KENALOG) 0.1 % CREAM    Apply topically 2 (two) times daily.   Modified Medications    No medications on file   Discontinued Medications    No medications on file       Review of Systems   Constitutional: Negative.    HENT: Negative.     Eyes: Negative.    Respiratory:  Positive for shortness of breath.    Cardiovascular: Negative.    Gastrointestinal: Negative.    Genitourinary: Negative.    Musculoskeletal: Negative.    Skin: Negative.    Neurological: Negative.    Endo/Heme/Allergies: Negative.    Psychiatric/Behavioral: Negative.     All 12 systems otherwise negative.      Wt Readings from Last 3 Encounters:   04/11/25 78.9 kg (173 lb 15.1 oz)   11/01/24 76.9 kg (169 lb 8.5 oz)   10/03/24 76.6 kg (168 lb 14 oz)     Temp Readings from Last 3 Encounters:   10/03/24 97.9 °F (36.6 °C) (Oral)   08/15/23 98 °F (36.7 °C) (Tympanic)   07/07/23 97.8 °F (36.6 °C) (Temporal)     BP Readings from Last 3 Encounters:   04/11/25 (!) 140/82   11/01/24 138/74   10/03/24 126/73     Pulse Readings from Last 3 Encounters:   04/11/25 63   11/01/24 63   10/03/24 77       BP (!) 140/82 (BP Location: Right arm, Patient Position: Sitting)   Pulse 63   Ht 5' 7" (1.702 m)   Wt 78.9 kg (173 lb 15.1 oz)   SpO2 99%   BMI 27.24 kg/m²     Objective:   Physical Exam  Vitals and nursing note reviewed.   Constitutional:       General: He is not in acute distress.     Appearance: He is " well-developed. He is obese. He is not diaphoretic.   HENT:      Head: Normocephalic and atraumatic.      Nose: Nose normal.   Eyes:      General: No scleral icterus.     Conjunctiva/sclera: Conjunctivae normal.   Neck:      Thyroid: No thyromegaly.      Vascular: No JVD.   Cardiovascular:      Rate and Rhythm: Normal rate and regular rhythm.      Heart sounds: S1 normal and S2 normal. Murmur heard.      No friction rub. No gallop. No S3 or S4 sounds.   Pulmonary:      Effort: Pulmonary effort is normal. No respiratory distress.      Breath sounds: Normal breath sounds. No stridor. No wheezing or rales.   Chest:      Chest wall: No tenderness.   Abdominal:      General: Bowel sounds are normal. There is no distension.      Palpations: Abdomen is soft. There is no mass.      Tenderness: There is no abdominal tenderness. There is no rebound.   Genitourinary:     Comments: Deferred  Musculoskeletal:         General: No tenderness or deformity. Normal range of motion.      Cervical back: Normal range of motion and neck supple.   Lymphadenopathy:      Cervical: No cervical adenopathy.   Skin:     General: Skin is warm and dry.      Coloration: Skin is not pale.      Findings: No erythema or rash.   Neurological:      Mental Status: He is alert and oriented to person, place, and time.      Motor: No abnormal muscle tone.      Coordination: Coordination normal.   Psychiatric:         Behavior: Behavior normal.         Thought Content: Thought content normal.         Judgment: Judgment normal.         Lab Results   Component Value Date     10/03/2024    K 3.5 10/03/2024     10/03/2024    CO2 18 (L) 10/03/2024    BUN 13 10/03/2024    CREATININE 1.0 10/03/2024     (H) 10/03/2024    HGBA1C 5.8 (H) 10/02/2024    MG 2.1 10/03/2024    AST 81 (H) 10/03/2024     (H) 10/03/2024    ALBUMIN 3.6 10/03/2024    PROT 7.3 10/03/2024    BILITOT 0.5 10/03/2024    WBC 8.77 10/03/2024    WBC 8.77 10/03/2024    HGB  13.2 (L) 10/03/2024    HGB 13.2 (L) 10/03/2024    HCT 41.0 10/03/2024    HCT 41.0 10/03/2024    MCV 90 10/03/2024    MCV 90 10/03/2024     10/03/2024     10/03/2024    INR 1.0 10/02/2024    TSH 1.330 10/08/2014    CHOL 204 (H) 10/08/2014    HDL 75 10/08/2014    LDLCALC 115.2 10/08/2014    TRIG 69 10/08/2014     (H) 10/02/2024         BNP (pg/mL)   Date Value   10/02/2024 162 (H)     INR (no units)   Date Value   10/02/2024 1.0   10/02/2024 1.0          Assessment:      1. Hypertension associated with diabetes    2. Pulmonary hypertension    3. Vitamin D deficiency    4. Acute pulmonary embolism, unspecified pulmonary embolism type, unspecified whether acute cor pulmonale present    5. Diabetes mellitus without complication    6. Encounter to establish care    7. History of pulmonary embolism          Plan:       Abnormal ECG,  VICKERS with PE 10/2024, pulm HTN  -  neg stress 7/2024  - ECHO 10/2024 with normal LV function, mildly dec RV function, PASP 70 mmHg worse than July 2024.   - cont Eliquis  - repeat ECHO ordered, eval RV function and PASP   - Heme/onc eval pending      2. HTN - mildly elevated today   - titrate meds    3. DM2 5.8   - cont tx     4. Vitamin D def  - cont Vit D    5. Obesity BMI 35 --> BMI 26 - 169 lbs  ---> BMI 27 - 173 lbs   - cont weight loss        Visit today included increased complexity associated with the care of the episodic problem dyspnea addressed and managing the longitudinal care of the patient due to the serious and/or complex managed problem(s) .      Thank you for allowing me to participate in this patient's care. Please do not hesitate to contact me with any questions or concerns. Consult note has been forwarded to the referral physician.

## 2025-04-12 LAB
OHS QRS DURATION: 84 MS
OHS QTC CALCULATION: 396 MS

## 2025-04-13 LAB
OHS QRS DURATION: 84 MS
OHS QTC CALCULATION: 396 MS

## 2025-04-25 ENCOUNTER — HOSPITAL ENCOUNTER (OUTPATIENT)
Dept: CARDIOLOGY | Facility: HOSPITAL | Age: 75
Discharge: HOME | End: 2025-04-25
Attending: INTERNAL MEDICINE
Payer: OTHER GOVERNMENT

## 2025-04-25 ENCOUNTER — OFFICE VISIT (OUTPATIENT)
Dept: HEMATOLOGY/ONCOLOGY | Facility: CLINIC | Age: 75
End: 2025-04-25
Payer: OTHER GOVERNMENT

## 2025-04-25 ENCOUNTER — RESULTS FOLLOW-UP (OUTPATIENT)
Dept: CARDIOLOGY | Facility: CLINIC | Age: 75
End: 2025-04-25

## 2025-04-25 VITALS
SYSTOLIC BLOOD PRESSURE: 140 MMHG | WEIGHT: 173 LBS | HEIGHT: 67 IN | BODY MASS INDEX: 27.15 KG/M2 | DIASTOLIC BLOOD PRESSURE: 82 MMHG

## 2025-04-25 VITALS
WEIGHT: 173.94 LBS | DIASTOLIC BLOOD PRESSURE: 77 MMHG | RESPIRATION RATE: 20 BRPM | HEART RATE: 92 BPM | SYSTOLIC BLOOD PRESSURE: 174 MMHG | BODY MASS INDEX: 27.3 KG/M2 | TEMPERATURE: 98 F | OXYGEN SATURATION: 98 % | HEIGHT: 67 IN

## 2025-04-25 DIAGNOSIS — R74.01 TRANSAMINITIS: ICD-10-CM

## 2025-04-25 DIAGNOSIS — E11.9 DIABETES MELLITUS WITHOUT COMPLICATION: ICD-10-CM

## 2025-04-25 DIAGNOSIS — I26.09 ACUTE PULMONARY EMBOLISM WITH ACUTE COR PULMONALE, UNSPECIFIED PULMONARY EMBOLISM TYPE: ICD-10-CM

## 2025-04-25 DIAGNOSIS — Z76.89 ENCOUNTER TO ESTABLISH CARE: ICD-10-CM

## 2025-04-25 DIAGNOSIS — E11.59 HYPERTENSION ASSOCIATED WITH DIABETES: ICD-10-CM

## 2025-04-25 DIAGNOSIS — C80.1 OCCULT MALIGNANCY: ICD-10-CM

## 2025-04-25 DIAGNOSIS — R21 RASH: ICD-10-CM

## 2025-04-25 DIAGNOSIS — E55.9 VITAMIN D DEFICIENCY: ICD-10-CM

## 2025-04-25 DIAGNOSIS — Z86.711 HISTORY OF PULMONARY EMBOLISM: ICD-10-CM

## 2025-04-25 DIAGNOSIS — I15.2 HYPERTENSION ASSOCIATED WITH DIABETES: ICD-10-CM

## 2025-04-25 DIAGNOSIS — Z12.5 PROSTATE CANCER SCREENING: Primary | ICD-10-CM

## 2025-04-25 DIAGNOSIS — I27.20 PULMONARY HYPERTENSION: ICD-10-CM

## 2025-04-25 LAB
AORTIC ROOT ANNULUS: 3.7 CM
AV INDEX (PROSTH): 0.72
AV MEAN GRADIENT: 3 MMHG
AV PEAK GRADIENT: 6 MMHG
AV REGURGITATION PRESSURE HALF TIME: 1093 MS
AV VALVE AREA BY VELOCITY RATIO: 2.4 CM²
AV VALVE AREA: 2.2 CM²
AV VELOCITY RATIO: 0.75
BSA FOR ECHO PROCEDURE: 1.93 M2
CV ECHO LV RWT: 0.39 CM
DOP CALC AO PEAK VEL: 1.2 M/S
DOP CALC AO VTI: 33.4 CM
DOP CALC LVOT AREA: 3.1 CM2
DOP CALC LVOT DIAMETER: 2 CM
DOP CALC LVOT PEAK VEL: 0.9 M/S
DOP CALC LVOT STROKE VOLUME: 75 CM3
DOP CALC RVOT PEAK VEL: 0.46 M/S
DOP CALC RVOT VTI: 14.8 CM
DOP CALCLVOT PEAK VEL VTI: 23.9 CM
E WAVE DECELERATION TIME: 175 MSEC
E/A RATIO: 1.26
E/E' RATIO: 13 M/S
ECHO LV POSTERIOR WALL: 1 CM (ref 0.6–1.1)
FRACTIONAL SHORTENING: 25.5 % (ref 28–44)
INTERVENTRICULAR SEPTUM: 1 CM (ref 0.6–1.1)
IVC DIAMETER: 2.1 CM
IVRT: 114 MSEC
LA MAJOR: 5.8 CM
LA MINOR: 4.9 CM
LA WIDTH: 3.4 CM
LEFT ATRIUM AREA SYSTOLIC (APICAL 2 CHAMBER): 19.27 CM2
LEFT ATRIUM AREA SYSTOLIC (APICAL 4 CHAMBER): 23.63 CM2
LEFT ATRIUM SIZE: 4 CM
LEFT ATRIUM VOLUME INDEX MOD: 36 ML/M2
LEFT ATRIUM VOLUME INDEX: 32 ML/M2
LEFT ATRIUM VOLUME MOD: 69 ML
LEFT ATRIUM VOLUME: 61 CM3
LEFT INTERNAL DIMENSION IN SYSTOLE: 3.8 CM (ref 2.1–4)
LEFT VENTRICLE DIASTOLIC VOLUME INDEX: 65.79 ML/M2
LEFT VENTRICLE DIASTOLIC VOLUME: 125 ML
LEFT VENTRICLE END SYSTOLIC VOLUME APICAL 2 CHAMBER: 58.71 ML
LEFT VENTRICLE END SYSTOLIC VOLUME APICAL 4 CHAMBER: 70.85 ML
LEFT VENTRICLE MASS INDEX: 99 G/M2
LEFT VENTRICLE SYSTOLIC VOLUME INDEX: 32.1 ML/M2
LEFT VENTRICLE SYSTOLIC VOLUME: 61 ML
LEFT VENTRICULAR INTERNAL DIMENSION IN DIASTOLE: 5.1 CM (ref 3.5–6)
LEFT VENTRICULAR MASS: 188 G
LV LATERAL E/E' RATIO: 12.6 M/S
LV SEPTAL E/E' RATIO: 14.1 M/S
LVED V (TEICH): 125.07 ML
LVES V (TEICH): 61.07 ML
LVOT MG: 1.54 MMHG
LVOT MV: 0.57 CM/S
MV PEAK A VEL: 0.9 M/S
MV PEAK E VEL: 1.13 M/S
OHS CV RV/LV RATIO: 0.61 CM
PISA AR MAX VEL: 3.6 M/S
PISA TR MAX VEL: 3.2 M/S
PV MEAN GRADIENT: 0 MMHG
PV MV: 0.51 M/S
PV PEAK GRADIENT: 1 MMHG
PV PEAK VELOCITY: 0.7 M/S
RA MAJOR: 4.69 CM
RA PRESSURE ESTIMATED: 8 MMHG
RA WIDTH: 3.15 CM
RIGHT VENTRICLE DIASTOLIC BASEL DIMENSION: 3.1 CM
RIGHT VENTRICULAR END-DIASTOLIC DIMENSION: 3.08 CM
RV TB RVSP: 11 MMHG
SINUS: 3.1 CM
STJ: 2.9 CM
TDI LATERAL: 0.09 M/S
TDI SEPTAL: 0.08 M/S
TDI: 0.09 M/S
TR MAX PG: 41 MMHG
TRICUSPID ANNULAR PLANE SYSTOLIC EXCURSION: 2.4 CM
TV REST PULMONARY ARTERY PRESSURE: 49 MMHG
Z-SCORE OF LEFT VENTRICULAR DIMENSION IN END DIASTOLE: -0.44
Z-SCORE OF LEFT VENTRICULAR DIMENSION IN END SYSTOLE: 1.15

## 2025-04-25 PROCEDURE — 99215 OFFICE O/P EST HI 40 MIN: CPT | Mod: PBBFAC | Performed by: NURSE PRACTITIONER

## 2025-04-25 PROCEDURE — 93306 TTE W/DOPPLER COMPLETE: CPT | Mod: 26,,, | Performed by: INTERNAL MEDICINE

## 2025-04-25 PROCEDURE — 93306 TTE W/DOPPLER COMPLETE: CPT

## 2025-04-25 PROCEDURE — 99999 PR PBB SHADOW E&M-EST. PATIENT-LVL V: CPT | Mod: PBBFAC,,, | Performed by: NURSE PRACTITIONER

## 2025-04-25 NOTE — PROGRESS NOTES
Subjective:      Patient ID: Cb Hooper is a 74 y.o. male.    Chief Complaint: no complaints    HPI:  75 yo male presents to the clinic as a new patient for further eval and recommendations - referred from Dr. Gonzalez, cardiologist, for acute pulmonary embolism diagnosed in 10/2024 by CTA chest.  Impression:   Extensive bilateral pulmonary emboli involving the main pulmonary arteries extending to all lobar branches and into segmental branches.  Right ventricle strain is suspected.    Eliquis 5 mg PO every 12 hours.  Denies any known bleeding since starting.    Family hx of cancer:  None known    He is accompanied by his daughter.    Denies known clotting history in the family or personally.    Denies surgical procedure prior to clotting incident or long distance travel.  Denies cigarette smoking.  He is active.  Denies Covid infection prior to clotting incident.    Last PSA in 2014 - were normal  Last colonoscopy 7/2023 + polyps - repeat in 3 years    Denies illicit drug use.  States drinks beer daily - 2 - 16 ounce beers daily (at most) about 40-50 years.     Denies f/c/ns or unintentional weight loss.  Denies any known abnormal lymphadenopathy.      Works part time with [employer].                I have reviewed all of the patient's relevant lab work available in the medical record and have utilized this in my evaluation and management recommendations today.    Social History[1]    Family History   Problem Relation Name Age of Onset    Diabetes Mother         Past Surgical History:   Procedure Laterality Date    APPENDECTOMY      COLONOSCOPY N/A 7/7/2023    Procedure: COLONOSCOPY;  Surgeon: Kyle Henry MD;  Location: [facility];  Service: Endoscopy;  Laterality: N/A;    KNEE SURGERY Bilateral        Past Medical History:   Diagnosis Date    Diabetes mellitus without complication     Hypertension associated with diabetes     Personal history of colonic polyps        Review of Systems    Constitutional: Negative.    HENT: Negative.     Eyes: Negative.    Respiratory: Negative.     Cardiovascular: Negative.    Gastrointestinal: Negative.    Endocrine: Negative.    Genitourinary: Negative.    Musculoskeletal: Negative.    Skin: Negative.    Allergic/Immunologic: Negative.    Neurological: Negative.    Hematological: Negative.    Psychiatric/Behavioral: Negative.            Medication List with Changes/Refills   Current Medications    AMLODIPINE (NORVASC) 10 MG TABLET    Take 10 mg by mouth once daily.    AMMONIUM LACTATE (LAC-HYDRIN) 12 % LOTION    Apply topically 2 (two) times daily.    APIXABAN (ELIQUIS DVT-PE TREAT 30D START) 5 MG (74 TABS) DSPK    For the first 7 days take two 5 mg tablets twice daily.  After 7 days take one 5 mg tablet twice daily.    CHOLECALCIFEROL, VITAMIN D3, (VITAMIN D3) 50 MCG (2,000 UNIT) TAB    Take 50 mcg by mouth once daily.    DICLOFENAC SODIUM (VOLTAREN) 1 % GEL    Apply 4 g topically 4 (four) times daily.    DORZOLAMIDE-TIMOLOL 2-0.5% (COSOPT) 22.3-6.8 MG/ML OPHTHALMIC SOLUTION    Place 1 drop into both eyes 2 (two) times daily.    EMOLLIENT COMBINATION NO.117 (EUCERIN ADVANCED REPAIR HAND) CREA    Apply topically once daily.    LIDOCAINE (LIDODERM) 5 %    Place 1 patch onto the skin daily as needed.    LOSARTAN (COZAAR) 100 MG TABLET    Take 50 mg by mouth once daily.    MELOXICAM (MOBIC) 7.5 MG TABLET    Take 7.5 mg by mouth once daily.    METFORMIN (GLUCOPHAGE-XR) 500 MG ER 24HR TABLET    Take 500 mg by mouth once daily.    MIRTAZAPINE (REMERON) 15 MG TABLET    Take 7.5 mg by mouth every evening.    PRAMOXINE 1 % LOTN    Apply topically daily as needed.    TRIAMCINOLONE ACETONIDE 0.1% (KENALOG) 0.1 % CREAM    Apply topically 2 (two) times daily.        Objective:     Vitals:    04/25/25 0905   BP: (!) 174/77   Pulse: 92   Resp: 20   Temp: 97.7 °F (36.5 °C)       Physical Exam  Vitals reviewed.   Constitutional:       Appearance: Normal appearance.   HENT:       Head: Normocephalic and atraumatic.      Right Ear: External ear normal.      Left Ear: External ear normal.   Cardiovascular:      Rate and Rhythm: Normal rate and regular rhythm.      Heart sounds: Normal heart sounds, S1 normal and S2 normal.   Pulmonary:      Effort: Pulmonary effort is normal.      Breath sounds: Normal breath sounds.   Abdominal:      General: There is no distension.   Musculoskeletal:         General: Normal range of motion.      Cervical back: Normal range of motion.      Right lower leg: Edema present.      Left lower leg: Edema present.   Lymphadenopathy:      Head:      Right side of head: No submental, submandibular, tonsillar, preauricular, posterior auricular or occipital adenopathy.      Left side of head: No submental, submandibular, tonsillar, preauricular, posterior auricular or occipital adenopathy.      Cervical: No cervical adenopathy.      Upper Body:      Right upper body: No supraclavicular adenopathy.      Left upper body: No supraclavicular adenopathy.   Skin:     General: Skin is warm and dry.             Comments: Itching and discoloration to skin for about a year now.   Neurological:      General: No focal deficit present.      Mental Status: He is alert and oriented to person, place, and time.   Psychiatric:         Attention and Perception: Attention and perception normal.         Mood and Affect: Mood and affect normal.         Speech: Speech normal.         Behavior: Behavior normal. Behavior is cooperative.         Thought Content: Thought content normal.         Cognition and Memory: Cognition and memory normal.         Judgment: Judgment normal.       Assessment:     Problem List Items Addressed This Visit    None      Lab Results   Component Value Date    WBC 8.77 10/03/2024    WBC 8.77 10/03/2024    RBC 4.55 (L) 10/03/2024    RBC 4.55 (L) 10/03/2024    HGB 13.2 (L) 10/03/2024    HGB 13.2 (L) 10/03/2024    HCT 41.0 10/03/2024    HCT 41.0 10/03/2024    MCV 90  "10/03/2024    MCV 90 10/03/2024    MCH 29.0 10/03/2024    MCH 29.0 10/03/2024    MCHC 32.2 10/03/2024    MCHC 32.2 10/03/2024    RDW 12.9 10/03/2024    RDW 12.9 10/03/2024     10/03/2024     10/03/2024    MPV 9.2 10/03/2024    MPV 9.2 10/03/2024    GRAN 5.1 10/03/2024    GRAN 58.6 10/03/2024    GRAN 5.1 10/03/2024    GRAN 58.6 10/03/2024    LYMPH 2.8 10/03/2024    LYMPH 32.3 10/03/2024    LYMPH 2.8 10/03/2024    LYMPH 32.3 10/03/2024    MONO 0.7 10/03/2024    MONO 7.4 10/03/2024    MONO 0.7 10/03/2024    MONO 7.4 10/03/2024    EOS 0.1 10/03/2024    EOS 0.1 10/03/2024    BASO 0.03 10/03/2024    BASO 0.03 10/03/2024    EOSINOPHIL 1.1 10/03/2024    EOSINOPHIL 1.1 10/03/2024    BASOPHIL 0.3 10/03/2024    BASOPHIL 0.3 10/03/2024      Lab Results   Component Value Date     10/03/2024    K 3.5 10/03/2024     10/03/2024    CO2 18 (L) 10/03/2024    BUN 13 10/03/2024    CREATININE 1.0 10/03/2024    CALCIUM 8.9 10/03/2024    ANIONGAP 13 10/03/2024    ESTGFRAFRICA >60.0 04/21/2015    EGFRNONAA 56.9 (A) 04/21/2015     Lab Results   Component Value Date     (H) 10/03/2024    AST 81 (H) 10/03/2024    ALKPHOS 133 10/03/2024    BILITOT 0.5 10/03/2024     No results found for: "IRON", "TRANSFERRIN", "TIBC", "FESATURATED", "FERRITIN"     Plan:   There are no diagnoses linked to this encounter.    Med Onc Chart Routing      Follow up with physician    Follow up with KEVIN . F/u first availble to review results - in person at    Infusion scheduling note   n/a   Injection scheduling note n/a   Labs   Scheduling:  Preferred lab: [facility]  Lab interval:  labs today cbc, cmp, psa, spep, flc, mini   Imaging   Ct c/a/p with contrast   Pharmacy appointment No pharmacy appointment needed      Other referrals       Additional referrals needed  dermatology     Since clotting incident was unprovoked would recommend life long anticoagulation with Eliquis 5 mg PO every 12 hours.  Will r/o occult malignancy " as cause of clotting incident.  He will f/u with first available visit in person to review workup results.         Collaborating Provider:  Dr. Amarilis Durham MD    Thank You,  Jammie Luque NP  Benign Hematology           [1]  Social History  Socioeconomic History    Marital status:    Tobacco Use    Smoking status: Former     Types: Cigarettes    Smokeless tobacco: Never   Substance and Sexual Activity    Alcohol use: Not Currently    Drug use: Never    Sexual activity: Not Currently     Social Drivers of Health     Financial Resource Strain: Low Risk  (10/2/2024)    Overall Financial Resource Strain (CARDIA)     Difficulty of Paying Living Expenses: Not hard at all   Food Insecurity: No Food Insecurity (10/2/2024)    Hunger Vital Sign     Worried About Running Out of Food in the Last Year: Never true     Ran Out of Food in the Last Year: Never true   Transportation Needs: No Transportation Needs (10/2/2024)    TRANSPORTATION NEEDS     Transportation : No   Physical Activity: Inactive (10/2/2024)    Exercise Vital Sign     Days of Exercise per Week: 0 days     Minutes of Exercise per Session: 0 min   Stress: No Stress Concern Present (10/2/2024)    [employer] - Occupational Stress Questionnaire     Feeling of Stress : Not at all   Housing Stability: Unknown (10/2/2024)    Housing Stability Vital Sign     Unable to Pay for Housing in the Last Year: No     Homeless in the Last Year: No

## 2025-05-02 ENCOUNTER — OFFICE VISIT (OUTPATIENT)
Dept: DERMATOLOGY | Facility: CLINIC | Age: 75
End: 2025-05-02
Payer: OTHER GOVERNMENT

## 2025-05-02 ENCOUNTER — HOSPITAL ENCOUNTER (OUTPATIENT)
Dept: RADIOLOGY | Facility: HOSPITAL | Age: 75
Discharge: HOME | End: 2025-05-02
Attending: NURSE PRACTITIONER
Payer: OTHER GOVERNMENT

## 2025-05-02 DIAGNOSIS — L82.0 INFLAMED SEBORRHEIC KERATOSIS: Primary | ICD-10-CM

## 2025-05-02 DIAGNOSIS — I26.09 ACUTE PULMONARY EMBOLISM WITH ACUTE COR PULMONALE, UNSPECIFIED PULMONARY EMBOLISM TYPE: ICD-10-CM

## 2025-05-02 DIAGNOSIS — R21 RASH: ICD-10-CM

## 2025-05-02 DIAGNOSIS — C80.1 OCCULT MALIGNANCY: ICD-10-CM

## 2025-05-02 DIAGNOSIS — L30.9 DERMATITIS: ICD-10-CM

## 2025-05-02 PROCEDURE — 74177 CT ABD & PELVIS W/CONTRAST: CPT | Mod: 26,,, | Performed by: RADIOLOGY

## 2025-05-02 PROCEDURE — 99999 PR PBB SHADOW E&M-EST. PATIENT-LVL IV: CPT | Mod: PBBFAC,,, | Performed by: STUDENT IN AN ORGANIZED HEALTH CARE EDUCATION/TRAINING PROGRAM

## 2025-05-02 PROCEDURE — A9698 NON-RAD CONTRAST MATERIALNOC: HCPCS | Performed by: NURSE PRACTITIONER

## 2025-05-02 PROCEDURE — 99214 OFFICE O/P EST MOD 30 MIN: CPT | Mod: PBBFAC | Performed by: STUDENT IN AN ORGANIZED HEALTH CARE EDUCATION/TRAINING PROGRAM

## 2025-05-02 PROCEDURE — 71260 CT THORAX DX C+: CPT | Mod: TC

## 2025-05-02 PROCEDURE — 25500020 PHARM REV CODE 255: Performed by: NURSE PRACTITIONER

## 2025-05-02 PROCEDURE — 71260 CT THORAX DX C+: CPT | Mod: 26,,, | Performed by: RADIOLOGY

## 2025-05-02 RX ORDER — LIDOCAINE 50 MG/G
1 PATCH TOPICAL DAILY PRN
Qty: 5 PATCH | Refills: 1 | Status: SHIPPED | OUTPATIENT
Start: 2025-05-02

## 2025-05-02 RX ORDER — MOMETASONE FUROATE 1 MG/G
OINTMENT TOPICAL 2 TIMES DAILY
Qty: 45 G | Refills: 2 | Status: SHIPPED | OUTPATIENT
Start: 2025-05-02

## 2025-05-02 RX ADMIN — IOHEXOL 1000 ML: 9 SOLUTION ORAL at 05:05

## 2025-05-02 RX ADMIN — IOHEXOL 100 ML: 350 INJECTION, SOLUTION INTRAVENOUS at 05:05

## 2025-05-02 NOTE — PROGRESS NOTES
Subjective:       Patient ID:  Cb Hooper is a 74 y.o. male who presents for   Chief Complaint   Patient presents with    Mole     C/o multiple moles on face and back that are changing in size and spreading    Spot     C/o itchy spots on lower legs     History of Present Illness: The patient presents with chief complaint of irritated growths on the body  Location: on the chest, back and on the face  Duration: has been developing these for many years, several starting to become irritated.  Signs/Symptoms: growing, painful and irritated lesions. Has one behind the ear that rubs against his glasses, causing bleeding  Prior treatments: none    Patient also reports having a recurring itchy and irritated rash on the legs, as well as diffuse dry skin on the body. Has tried TAC with little improvement.     Mole    Spot        Review of Systems   Constitutional:  Negative for fever and chills.   Skin:  Positive for itching, rash and dry skin.        Objective:    Physical Exam   Constitutional: He appears well-developed and well-nourished. No distress.   Neurological: He is alert and oriented to person, place, and time. He is not disoriented.   Psychiatric: He has a normal mood and affect.   Skin:   Areas Examined (abnormalities noted in diagram):   Head / Face Inspection Performed  Neck Inspection Performed  Chest / Axilla Inspection Performed  Abdomen Inspection Performed  Back Inspection Performed  RUE Inspected  LUE Inspection Performed  RLE Inspected  LLE Inspection Performed                   Diagram Legend     Erythematous scaling macule/papule c/w actinic keratosis       Vascular papule c/w angioma      Pigmented verrucoid papule/plaque c/w seborrheic keratosis      Yellow umbilicated papule c/w sebaceous hyperplasia      Irregularly shaped tan macule c/w lentigo     1-2 mm smooth white papules consistent with Milia      Movable subcutaneous cyst with punctum c/w epidermal inclusion cyst      Subcutaneous  movable cyst c/w pilar cyst      Firm pink to brown papule c/w dermatofibroma      Pedunculated fleshy papule(s) c/w skin tag(s)      Evenly pigmented macule c/w junctional nevus     Mildly variegated pigmented, slightly irregular-bordered macule c/w mildly atypical nevus      Flesh colored to evenly pigmented papule c/w intradermal nevus       Pink pearly papule/plaque c/w basal cell carcinoma      Erythematous hyperkeratotic cursted plaque c/w SCC      Surgical scar with no sign of skin cancer recurrence      Open and closed comedones      Inflammatory papules and pustules      Verrucoid papule consistent consistent with wart     Erythematous eczematous patches and plaques     Dystrophic onycholytic nail with subungual debris c/w onychomycosis     Umbilicated papule    Erythematous-base heme-crusted tan verrucoid plaque consistent with inflamed seborrheic keratosis     Erythematous Silvery Scaling Plaque c/w Psoriasis     See annotation      Assessment / Plan:        Inflamed seborrheic keratosis  Cryosurgery procedure note:    Verbal consent from the patient is obtained including, but not limited to, risk of hypopigmentation/hyperpigmentation, scar, recurrence of lesion. Liquid nitrogen cryosurgery is applied to 7 lesions to produce a freeze injury. The patient is aware that blisters may form and is instructed on wound care with gentle cleansing and use of vaseline ointment to keep moist until healed. The patient is supplied a handout on cryosurgery and is instructed to call if lesions do not completely resolve.    Dermatitis  -     mometasone (ELOCON) 0.1 % ointment; Apply topically 2 (two) times a day.  Dispense: 45 g; Refill: 2  -     Counseled patient on gentle skin care regimen, including need for sensitive soaps/detergents, as well as need for frequent use of sensitize moisturizers.       Other orders  -     LIDOcaine (LIDODERM) 5 %; Place 1 patch onto the skin daily as needed.  Dispense: 5 patch; Refill:  1             Follow up in about 3 months (around 8/2/2025).

## 2025-05-05 ENCOUNTER — RESULTS FOLLOW-UP (OUTPATIENT)
Dept: HEMATOLOGY/ONCOLOGY | Facility: CLINIC | Age: 75
End: 2025-05-05

## 2025-05-30 ENCOUNTER — OFFICE VISIT (OUTPATIENT)
Dept: HEMATOLOGY/ONCOLOGY | Facility: CLINIC | Age: 75
End: 2025-05-30
Payer: OTHER GOVERNMENT

## 2025-05-30 VITALS
OXYGEN SATURATION: 100 % | BODY MASS INDEX: 27.06 KG/M2 | TEMPERATURE: 98 F | RESPIRATION RATE: 18 BRPM | HEIGHT: 67 IN | DIASTOLIC BLOOD PRESSURE: 75 MMHG | WEIGHT: 172.38 LBS | HEART RATE: 70 BPM | SYSTOLIC BLOOD PRESSURE: 124 MMHG

## 2025-05-30 DIAGNOSIS — Z79.01 CHRONIC ANTICOAGULATION: ICD-10-CM

## 2025-05-30 DIAGNOSIS — D64.9 NORMOCYTIC ANEMIA: ICD-10-CM

## 2025-05-30 DIAGNOSIS — I26.09 ACUTE PULMONARY EMBOLISM WITH ACUTE COR PULMONALE, UNSPECIFIED PULMONARY EMBOLISM TYPE: Primary | ICD-10-CM

## 2025-05-30 DIAGNOSIS — R91.1 PULMONARY NODULE: ICD-10-CM

## 2025-05-30 PROCEDURE — 99999 PR PBB SHADOW E&M-EST. PATIENT-LVL IV: CPT | Mod: PBBFAC,,, | Performed by: NURSE PRACTITIONER

## 2025-05-30 PROCEDURE — 99214 OFFICE O/P EST MOD 30 MIN: CPT | Mod: PBBFAC | Performed by: NURSE PRACTITIONER

## 2025-05-30 NOTE — PROGRESS NOTES
Subjective:      Patient ID: Cb Hooper is a 74 y.o. male.    Chief Complaint: no complaints    HPI:  75 yo male presents to the clinic as a new patient for further eval and recommendations - referred from Dr. Gonazlez, cardiologist, for acute pulmonary embolism diagnosed in 10/2024 by CTA chest.  Impression:   Extensive bilateral pulmonary emboli involving the main pulmonary arteries extending to all lobar branches and into segmental branches.  Right ventricle strain is suspected.    Eliquis 5 mg PO every 12 hours.  Denies any known bleeding since starting.    Family hx of cancer:  None known    He is accompanied by his daughter.    Denies known clotting history in the family or personally.    Denies surgical procedure prior to clotting incident or long distance travel.  Denies cigarette smoking.  He is active.  Denies Covid infection prior to clotting incident.    Last PSA in 2014 - were normal  Last colonoscopy 7/2023 + polyps - repeat in 3 years    Denies illicit drug use.  States drinks beer daily - 2 - 16 ounce beers daily (at most) about 40-50 years.     Denies f/c/ns or unintentional weight loss.  Denies any known abnormal lymphadenopathy.      Works part time with mosquito [employer].      Interval history:  5/30/2025 Presents today to review results of workup to r/o occult malignancy as cause for hypercoag state.  CT c/a/p with contrast reviewed with NE or metastatic disease.  A 9 mm nodule of left upper lobe lung found and was similar to prior exam.  Recommends continued surveillance.  Has no complaints today.  Continues to take Eliquis 5 mg PO every 12 hours without issues.  PSA normal, no monoclonal paraproteinemia.    I have reviewed all of the patient's relevant lab work available in the medical record and have utilized this in my evaluation and management recommendations today.    Social History[1]    Family History   Problem Relation Name Age of Onset    Diabetes Mother         Past Surgical History:    Procedure Laterality Date    APPENDECTOMY      COLONOSCOPY N/A 7/7/2023    Procedure: COLONOSCOPY;  Surgeon: Kyle Henry MD;  Location: [facility];  Service: Endoscopy;  Laterality: N/A;    KNEE SURGERY Bilateral        Past Medical History:   Diagnosis Date    Diabetes mellitus without complication     Hypertension associated with diabetes     Personal history of colonic polyps        Review of Systems   Constitutional: Negative.    HENT: Negative.     Eyes: Negative.    Respiratory: Negative.     Cardiovascular: Negative.    Gastrointestinal: Negative.    Endocrine: Negative.    Genitourinary: Negative.    Musculoskeletal: Negative.    Skin: Negative.    Allergic/Immunologic: Negative.    Neurological: Negative.    Hematological: Negative.    Psychiatric/Behavioral: Negative.            Medication List with Changes/Refills   Current Medications    AMLODIPINE (NORVASC) 10 MG TABLET    Take 10 mg by mouth once daily.    AMMONIUM LACTATE (LAC-HYDRIN) 12 % LOTION    Apply topically 2 (two) times daily.    APIXABAN (ELIQUIS DVT-PE TREAT 30D START) 5 MG (74 TABS) DSPK    For the first 7 days take two 5 mg tablets twice daily.  After 7 days take one 5 mg tablet twice daily.    CHOLECALCIFEROL, VITAMIN D3, (VITAMIN D3) 50 MCG (2,000 UNIT) TAB    Take 50 mcg by mouth once daily.    DICLOFENAC SODIUM (VOLTAREN) 1 % GEL    Apply 4 g topically 4 (four) times daily.    DORZOLAMIDE-TIMOLOL 2-0.5% (COSOPT) 22.3-6.8 MG/ML OPHTHALMIC SOLUTION    Place 1 drop into both eyes 2 (two) times daily.    EMOLLIENT COMBINATION NO.117 (EUCERIN ADVANCED REPAIR HAND) CREA    Apply topically once daily.    LIDOCAINE (LIDODERM) 5 %    Place 1 patch onto the skin daily as needed.    LOSARTAN (COZAAR) 100 MG TABLET    Take 50 mg by mouth once daily.    MELOXICAM (MOBIC) 7.5 MG TABLET    Take 7.5 mg by mouth once daily.    METFORMIN (GLUCOPHAGE-XR) 500 MG ER 24HR TABLET    Take 500 mg by mouth once daily.    MIRTAZAPINE (REMERON) 15 MG  TABLET    Take 7.5 mg by mouth every evening.    MOMETASONE (ELOCON) 0.1 % OINTMENT    Apply topically 2 (two) times a day.    PRAMOXINE 1 % LOTN    Apply topically daily as needed.    TRIAMCINOLONE ACETONIDE 0.1% (KENALOG) 0.1 % CREAM    Apply topically 2 (two) times daily.        Objective:     There were no vitals filed for this visit.      Physical Exam  Vitals reviewed.   Constitutional:       Appearance: Normal appearance.   HENT:      Head: Normocephalic and atraumatic.      Right Ear: External ear normal.      Left Ear: External ear normal.   Cardiovascular:      Rate and Rhythm: Normal rate and regular rhythm.      Heart sounds: Normal heart sounds, S1 normal and S2 normal.   Pulmonary:      Effort: Pulmonary effort is normal.      Breath sounds: Normal breath sounds.   Abdominal:      General: There is no distension.   Musculoskeletal:         General: Normal range of motion.      Cervical back: Normal range of motion.      Right lower leg: Edema present.      Left lower leg: Edema present.   Lymphadenopathy:      Head:      Right side of head: No submental, submandibular, tonsillar, preauricular, posterior auricular or occipital adenopathy.      Left side of head: No submental, submandibular, tonsillar, preauricular, posterior auricular or occipital adenopathy.      Cervical: No cervical adenopathy.      Upper Body:      Right upper body: No supraclavicular adenopathy.      Left upper body: No supraclavicular adenopathy.   Skin:     General: Skin is warm and dry.             Comments: Itching and discoloration to skin for about a year now.   Neurological:      General: No focal deficit present.      Mental Status: He is alert and oriented to person, place, and time.   Psychiatric:         Attention and Perception: Attention and perception normal.         Mood and Affect: Mood and affect normal.         Speech: Speech normal.         Behavior: Behavior normal. Behavior is cooperative.         Thought  "Content: Thought content normal.         Cognition and Memory: Cognition and memory normal.         Judgment: Judgment normal.         Assessment:     Problem List Items Addressed This Visit    None      Lab Results   Component Value Date    WBC 5.97 04/25/2025    RBC 4.88 04/25/2025    HGB 13.4 (L) 04/25/2025    HCT 42.7 04/25/2025    MCV 88 04/25/2025    MCH 27.5 04/25/2025    MCHC 31.4 (L) 04/25/2025    RDW 13.5 04/25/2025     04/25/2025    MPV 9.6 04/25/2025    GRAN 5.1 10/03/2024    GRAN 58.6 10/03/2024    GRAN 5.1 10/03/2024    GRAN 58.6 10/03/2024    LYMPH 32.0 04/25/2025    LYMPH 1.91 04/25/2025    MONO 7.9 04/25/2025    MONO 0.47 04/25/2025    EOS 0.7 04/25/2025    EOS 0.04 04/25/2025    BASO 0.03 10/03/2024    BASO 0.03 10/03/2024    EOSINOPHIL 1.1 10/03/2024    EOSINOPHIL 1.1 10/03/2024    BASOPHIL 0.3 04/25/2025    BASOPHIL 0.02 04/25/2025      Lab Results   Component Value Date     04/25/2025    K 4.4 04/25/2025     04/25/2025    CO2 23 04/25/2025    BUN 13 04/25/2025    CREATININE 1.0 04/25/2025    CALCIUM 9.0 04/25/2025    ANIONGAP 8 04/25/2025    ESTGFRAFRICA >60.0 04/21/2015    EGFRNONAA 56.9 (A) 04/21/2015     Lab Results   Component Value Date    ALT 23 04/25/2025    AST 23 04/25/2025    ALKPHOS 59 04/25/2025    BILITOT 0.5 04/25/2025     No results found for: "IRON", "TRANSFERRIN", "TIBC", "FESATURATED", "FERRITIN"     Plan:   There are no diagnoses linked to this encounter.    Med Onc Chart Routing      Follow up with physician    Follow up with KEVIN 6 months. in person visit in early morning at  with labs prior   Infusion scheduling note   n/a   Injection scheduling note n/a   Labs   Scheduling:  Preferred lab: [facility]  Lab interval:  cbc, cmp, iron studies (would like to schedule it on the Sat before visit)   Imaging   N/a   Pharmacy appointment No pharmacy appointment needed      Other referrals No nutrition appointment needed -        No additional referrals " needed  n/a     Since clotting incident was unprovoked would recommend life long anticoagulation with Eliquis 5 mg PO every 12 hours. At next visit in 6 months will reduce to Eliquis 2.5 mg PO ever 12 hours for prophylactic treatment.    Total time spent on encounter: 35 minutes    Collaborating Provider:  Dr. Amarilis Durham MD    Thank You,  Jammie Luque NP  Benign Hematology           [1]   Social History  Socioeconomic History    Marital status:    Tobacco Use    Smoking status: Former     Types: Cigarettes    Smokeless tobacco: Never   Substance and Sexual Activity    Alcohol use: Not Currently    Drug use: Never    Sexual activity: Not Currently     Social Drivers of Health     Financial Resource Strain: Low Risk  (10/2/2024)    Overall Financial Resource Strain (CARDIA)     Difficulty of Paying Living Expenses: Not hard at all   Food Insecurity: No Food Insecurity (10/2/2024)    Hunger Vital Sign     Worried About Running Out of Food in the Last Year: Never true     Ran Out of Food in the Last Year: Never true   Transportation Needs: No Transportation Needs (10/2/2024)    TRANSPORTATION NEEDS     Transportation : No   Physical Activity: Inactive (10/2/2024)    Exercise Vital Sign     Days of Exercise per Week: 0 days     Minutes of Exercise per Session: 0 min   Stress: No Stress Concern Present (10/2/2024)    [employer] - Occupational Stress Questionnaire     Feeling of Stress : Not at all   Housing Stability: Unknown (10/2/2024)    Housing Stability Vital Sign     Unable to Pay for Housing in the Last Year: No     Homeless in the Last Year: No

## 2025-06-05 ENCOUNTER — PROCEDURE VISIT (OUTPATIENT)
Dept: DERMATOLOGY | Facility: CLINIC | Age: 75
End: 2025-06-05

## 2025-06-05 DIAGNOSIS — D48.5 NEOPLASM OF UNCERTAIN BEHAVIOR OF SKIN: Primary | ICD-10-CM

## 2025-06-05 DIAGNOSIS — L82.0 INFLAMED SEBORRHEIC KERATOSIS: ICD-10-CM
